# Patient Record
Sex: FEMALE | Race: WHITE
[De-identification: names, ages, dates, MRNs, and addresses within clinical notes are randomized per-mention and may not be internally consistent; named-entity substitution may affect disease eponyms.]

---

## 2017-02-06 ENCOUNTER — HOSPITAL ENCOUNTER (EMERGENCY)
Dept: HOSPITAL 53 - M ED | Age: 57
Discharge: HOME | End: 2017-02-06
Payer: COMMERCIAL

## 2017-02-06 DIAGNOSIS — Z79.899: ICD-10-CM

## 2017-02-06 DIAGNOSIS — G50.1: Primary | ICD-10-CM

## 2017-02-06 DIAGNOSIS — I10: ICD-10-CM

## 2017-02-06 NOTE — EDDOCDS
Physician Documentation                                                                           

[facility]                                                                         

Name: Madyson Snider                                                                                 

Age: 56 yrs                                                                                       

Sex: Female                                                                                       

: 1960                                                                                   

MRN: [id number]                                                                                     

Arrival Date: 2017                                                                          

Time: 11:14                                                                                       

Account#: [id number]                                                                              

Bed TR7                                                                                           

Private MD: MOJGAN TINAJERO                                                                           

Disposition:                                                                                      

17 12:21 Discharged to Home/Self Care. Impression: Atypical facial pain - prob dental       

abscess.                                                                                        

- Condition is Stable.                                                                            

- Discharge Instructions: Dental Abscess.                                                         

- Prescriptions for Clindamycin HCl 300 mg Oral Capsule - take 1 capsule by ORAL route            

every 6 hours; 40 capsule. Prednisone 20 mg Oral Tablet - take 1 tablet by ORAL route           

once daily for 5 days; 5 tablet. Hydrocodone- Acetaminophen 5-325 mg Oral Tablet -              

take 1 tablet by ORAL route every 6 hours As needed MDD: 4 tabs; 12 tablet.                     

- Medication Reconciliation, Local Pharmacy Hours form.                                           

- Follow up: MOJGAN TINAJERO; When: Call to arrange an appointment; Reason: Wound/Symptom             

Recheck, Recheck today's complaints, Worsening of conditions, Continuance of care.              

- Problem is an ongoing problem.                                                                  

- Symptoms are unchanged.                                                                         

- Notes: Call for follow up with the oral surgeon today. thanks.                                  

                                                                                                  

                                                                                                  

Historical:                                                                                       

- Allergies: no known allergies;                                                                  

- Home Meds:                                                                                      

1. atenolol 25 mg Oral tab 1 tab once daily                                                     

     (Last dose: 2017 07:00)                                                                

2. lisinopril 10 mg Oral tab 1 tab once daily                                                   

     (Last dose: 2017 07:00)                                                                

3. hydroxyzine HCl 50 mg Oral tab nightly                                                       

     (Last dose: 2017 20:00)                                                                

4. fluoxetine 10 mg Oral cap daily                                                              

     (Last dose: 2017 07:00)                                                                

5. trazodone 50 mg Oral tab daily                                                               

     (Last dose: 2017 20:00)                                                                

- PMHx: Hypertension;                                                                             

- PSHx: none;                                                                                     

- Social history: Smoking status: Patient states was never smoker of tobacco. No                  

barriers to communication noted, Speaks appropriately for age.                                  

- Family history: Not pertinent.                                                                  

- : The pt / caregiver states he / she is not on anticoagulants. Home medication list             

is obtained from the patient.                                                                   

- Exposure Risk Screening:: None identified.                                                      

                                                                                                  

                                                                                                  

OB/GYN:                                                                                           

                                                                                             

12:28 LMP 2017                                                                            [id number]

                                                                                                  

Vital Signs:                                                                                      

11:17  / 79; Pulse 93; Resp 18; Temp 97.5(O); Pulse Ox 97% on R/A; Weight 79.38 kg /    ct3 

      175 lbs (R); Height 5 ft. 0 in. (152.40 cm) (R); Pain 910;                                 

11:17 Body Mass Index 34.18 (79.38 kg, 152.40 cm)                                             ct3 

                                                                                                  

Signatures:                                                                                       

Giovanni Bonilla RN                       RN   ml6                                                  

Re Mckeon RN                    RN   js13                                                 

Olegario Wells, PA-C                    PA-C cc10                                                 

                                                                                                  

**************************************************************************************************

MTDD

## 2017-02-06 NOTE — EDDOCDS
Nurse's Notes                                                                                     

[facility]                                                                         

Name: Madyson Snider                                                                                 

Age: 56 yrs                                                                                       

Sex: Female                                                                                       

: 1960                                                                                   

MRN: [id number]                                                                                     

Arrival Date: 2017                                                                          

Time: 11:14                                                                                       

Account#: [id number]                                                                              

Bed TR7                                                                                           

Private MD: MOJGAN TINAJERO                                                                           

Diagnosis: Atypical facial pain-prob dental abscess                                               

                                                                                                  

Presentation:                                                                                     

                                                                                             

11:22 Presenting complaint: Patient states: states dental abscess, left upper jaw. Adult      ml6 

      Sepsis Screening: The patient does not have new or worsening altered mentation.             

      Patient's respiratory rate is less than 22. Systolic blood pressure is greater than         

      100. Patient has a qSOFA score of 0- Negative Sepsis Screen. Suicide/Homicide risk          

      assessment- the patient denies having any suicidal and/or homicidal ideations and does      

      not present with any other emotional, behavioral or mental health complaints.       

      Status: Patient is not a  or  dependent. Transition of care:          

      patient was not received from another setting of care.                                      

11:22 Acuity: PETER Level 4                                                                     ml6 

11:22 Method Of Arrival: Walkin/Carried/Asstd                                                 ml6 

                                                                                                  

Triage Assessment:                                                                                

11:26 General: Appears in no apparent distress, Behavior is appropriate for age, cooperative. ml6 

      Pain: Location: left cheek Pain currently is 6 out of 10 on a pain scale. HIV screening     

      NA for this visit Offered previously. Neurological: No deficits noted. Cardiovascular:      

      No deficits noted. Capillary refill < 3 seconds is brisk in bilateral fingers toes.         

      Respiratory: No deficits noted. GI: No deficits noted.                                      

                                                                                                  

OB/GYN:                                                                                           

12:28 LMP 2017                                                                            [id number]

                                                                                                  

Historical:                                                                                       

- Allergies: no known allergies;                                                                  

- Home Meds:                                                                                      

1. atenolol 25 mg Oral tab 1 tab once daily                                                     

     (Last dose: 2017 07:00)                                                                

2. lisinopril 10 mg Oral tab 1 tab once daily                                                   

     (Last dose: 2017 07:00)                                                                

3. hydroxyzine HCl 50 mg Oral tab nightly                                                       

     (Last dose: 2017 20:00)                                                                

4. fluoxetine 10 mg Oral cap daily                                                              

     (Last dose: 2017 07:00)                                                                

5. trazodone 50 mg Oral tab daily                                                               

     (Last dose: 2017 20:00)                                                                

- PMHx: Hypertension;                                                                             

- PSHx: none;                                                                                     

- Social history: Smoking status: Patient states was never smoker of tobacco. No                  

barriers to communication noted, Speaks appropriately for age.                                  

- Family history: Not pertinent.                                                                  

- : The pt / caregiver states he / she is not on anticoagulants. Home medication list             

is obtained from the patient.                                                                   

- Exposure Risk Screening:: None identified.                                                      

                                                                                                  

                                                                                                  

Screenin:27 Screening information is obtained from the patient. Fall risk: No risks identified.     js13

      Assistance ADL's: requires no assistance with activities of daily living. Abuse/DV          

      Screen: The patient / caregiver reports he/she is: not in a situation that causes fear,     

      pain or injury. Nutritional screening: No deficits noted. Advance Directives: There is      

      no active DNR order. home support is adequate.                                              

                                                                                                  

Assessment:                                                                                       

12:27 General: Appears in no apparent distress, Behavior is appropriate for age, cooperative. js13

      Pain: Pain currently is 5 out of 10 on a pain scale. Neurological: Level of                 

      Consciousness is awake, alert. Respiratory: Airway is patent Respiratory effort is          

      even, unlabored, Respiratory pattern is regular. Derm: Skin is pink, warm & dry.          

                                                                                                  

Vital Signs:                                                                                      

11:17  / 79; Pulse 93; Resp 18; Temp 97.5(O); Pulse Ox 97% on R/A; Weight 79.38 kg (R); ct3 

      Height 5 ft. 0 in. (152.40 cm) (R); Pain 9/10;                                              

11:17 Body Mass Index 34.18 (79.38 kg, 152.40 cm)                                             ct3 

                                                                                                  

Vitals:                                                                                           

11:17 Log In Time: 2017 at 11:15.                                                ct3 

                                                                                                  

ED Course:                                                                                        

11:15 Patient visited by Yu Eldridge PCA.                                              ct3 

11:15 Patient moved to Waiting                                                                ct3 

11:16 MOJGAN TINAJERO is Private Physician.                                                       ct3 

11:19 Patient moved to Pre RCE                                                                ct3 

11:23 Triage Initiated                                                                        ml6 

11:54 Patient moved to Triage 1                                                               ar3 

12:09 [facility]                                                           cc10

12:09 Lundborg-Gray, Maja, MD is Attending Physician.                                         cc10

12:09 Patient visited by Olegario Wells PA-C.                                                cc10

12:09 Patient visited by Olegario Wells PA-C.                                                cc10

12:20 MOJGAN TINAJERO is Referral Physician.                                                      cc10

12:27 The patient / caregiver is instructed regarding the plan of care and ED course.         js13

12:27 No IV's were initiated during this patient's visit. No procedures done that require     js13

      assistance.                                                                                 

12:31 Patient moved to TR7                                                                    ar3 

                                                                                                  

Order Results:                                                                                    

There are currently no results for this order.                                                    

Outcome:                                                                                          

12:21 Discharge ordered by Provider.                                                          cc10

12:27 Discharge Assessment: Patient awake, alert and oriented x 3. No cognitive and/or        js13

      functional deficits noted. Patient verbalized understanding of disposition                  

      instructions. patient administered narcotics - no. The following High Risk Discharge        

      criteria are identified: None. Discharged to home ambulatory. Condition: stable.            

      Discharge instructions given to patient, Instructed on discharge instructions, follow       

      up and referral plans. medication usage, Demonstrated understanding of instructions,        

      medications, Pt was receptive of discharge instructions/ teaching. Prescriptions given      

      X 3. No special radiology studies were completed. Property :Personal belongings             

      accompany Pt.                                                                               

12:31 Patient left the ED.                                                                    js13

                                                                                                  

Signatures:                                                                                       

Giovanni Bonilla, RN                       RN   ml6                                                  

Dimple Zamudio, PCA                      PCA  ar3                                                  

Yu Eldridge, PCA                  PCA  ct3                                                  

Re MkceonRN                    RN   js13                                                 

Olegario Wells, PA-C                    PA-C cc10                                                 

                                                                                                  

**************************************************************************************************

MTDD

## 2017-02-08 NOTE — EDDOCDS
Nurse's Notes                                                                                     

[facility]                                                                         

Name: Madyson Snider                                                                                 

Age: 56 yrs                                                                                       

Sex: Female                                                                                       

: 1960                                                                                   

MRN: [id number]                                                                                     

Arrival Date: 2017                                                                          

Time: 11:14                                                                                       

Account#: [id number]                                                                              

Bed TR7                                                                                           

Private MD: MOJGAN TINAJERO                                                                           

Diagnosis: Atypical facial pain-prob dental abscess                                               

                                                                                                  

Presentation:                                                                                     

                                                                                             

11:22 Presenting complaint: Patient states: states dental abscess, left upper jaw. Adult      ml6 

      Sepsis Screening: The patient does not have new or worsening altered mentation.             

      Patient's respiratory rate is less than 22. Systolic blood pressure is greater than         

      100. Patient has a qSOFA score of 0- Negative Sepsis Screen. Suicide/Homicide risk          

      assessment- the patient denies having any suicidal and/or homicidal ideations and does      

      not present with any other emotional, behavioral or mental health complaints.       

      Status: Patient is not a  or  dependent. Transition of care:          

      patient was not received from another setting of care.                                      

11:22 Acuity: PETER Level 4                                                                     ml6 

11:22 Method Of Arrival: Walkin/Carried/Asstd                                                 ml6 

                                                                                                  

Triage Assessment:                                                                                

11:26 General: Appears in no apparent distress, Behavior is appropriate for age, cooperative. ml6 

      Pain: Location: left cheek Pain currently is 6 out of 10 on a pain scale. HIV screening     

      NA for this visit Offered previously. Neurological: No deficits noted. Cardiovascular:      

      No deficits noted. Capillary refill < 3 seconds is brisk in bilateral fingers toes.         

      Respiratory: No deficits noted. GI: No deficits noted.                                      

                                                                                                  

OB/GYN:                                                                                           

12:28 LMP 2017                                                                            js13

                                                                                                  

Historical:                                                                                       

- Allergies: no known allergies;                                                                  

- Home Meds:                                                                                      

1. atenolol 25 mg Oral tab 1 tab once daily                                                     

     (Last dose: 2017 07:00)                                                                

2. lisinopril 10 mg Oral tab 1 tab once daily                                                   

     (Last dose: 2017 07:00)                                                                

3. hydroxyzine HCl 50 mg Oral tab nightly                                                       

     (Last dose: 2017 20:00)                                                                

4. fluoxetine 10 mg Oral cap daily                                                              

     (Last dose: 2017 07:00)                                                                

5. trazodone 50 mg Oral tab daily                                                               

     (Last dose: 2017 20:00)                                                                

- PMHx: Hypertension;                                                                             

- PSHx: none;                                                                                     

- Social history: Smoking status: Patient states was never smoker of tobacco. No                  

barriers to communication noted, Speaks appropriately for age.                                  

- Family history: Not pertinent.                                                                  

- : The pt / caregiver states he / she is not on anticoagulants. Home medication list             

is obtained from the patient.                                                                   

- Exposure Risk Screening:: None identified.                                                      

                                                                                                  

                                                                                                  

Screenin:27 Screening information is obtained from the patient. Fall risk: No risks identified.     js13

      Assistance ADL's: requires no assistance with activities of daily living. Abuse/DV          

      Screen: The patient / caregiver reports he/she is: not in a situation that causes fear,     

      pain or injury. Nutritional screening: No deficits noted. Advance Directives: There is      

      no active DNR order. home support is adequate.                                              

                                                                                                  

Assessment:                                                                                       

12:27 General: Appears in no apparent distress, Behavior is appropriate for age, cooperative. js13

      Pain: Pain currently is 5 out of 10 on a pain scale. Neurological: Level of                 

      Consciousness is awake, alert. Respiratory: Airway is patent Respiratory effort is          

      even, unlabored, Respiratory pattern is regular. Derm: Skin is pink, warm & dry.          

                                                                                                  

Vital Signs:                                                                                      

11:17  / 79; Pulse 93; Resp 18; Temp 97.5(O); Pulse Ox 97% on R/A; Weight 79.38 kg (R); ct3 

      Height 5 ft. 0 in. (152.40 cm) (R); Pain 9/10;                                              

11:17 Body Mass Index 34.18 (79.38 kg, 152.40 cm)                                             ct3 

                                                                                                  

Vitals:                                                                                           

11:17 Log In Time: 2017 at 11:15.                                                ct3 

                                                                                                  

ED Course:                                                                                        

11:15 Patient visited by Yu Eldridge PCA.                                              ct3 

11:15 Patient moved to Waiting                                                                ct3 

11:16 MOJGAN TINAJERO is Private Physician.                                                       ct3 

11:19 Patient moved to Pre RCE                                                                ct3 

11:23 Triage Initiated                                                                        ml6 

11:54 Patient moved to Triage 1                                                               ar3 

12:09 [facility]                                                           cc[facility]

12:09 Lundborg-Gray, Maja, MD is Attending Physician.                                         cc10

12:09 Patient visited by Olegario Wells PA-C.                                                cc10

12:09 Patient visited by Olegario Wells PA-C.                                                cc10

12:20 MOJGAN TINAJERO is Referral Physician.                                                      cc10

12:27 The patient / caregiver is instructed regarding the plan of care and ED course.         js13

12:27 No IV's were initiated during this patient's visit. No procedures done that require     js13

      assistance.                                                                                 

12:31 Patient moved to TR7                                                                    ar3 

12:36 [facility] Payment Agreement was scanned into [employer] and attached to record.               jp5 

15:19 Patient name changed from Madyson\S\M\S\Agatha\S\ to Madyson\S\Marchel\S\Agatha.                     
   EDMS

15:52 T-Sheet-- Draft Copy was scanned into [employer] and attached to record.                   klr 

                                                                                                  

Order Results:                                                                                    

There are currently no results for this order.                                                    

Outcome:                                                                                          

12:21 Discharge ordered by Provider.                                                          cc10

12:27 Discharge Assessment: Patient awake, alert and oriented x 3. No cognitive and/or        js13

      functional deficits noted. Patient verbalized understanding of disposition                  

      instructions. patient administered narcotics - no. The following High Risk Discharge        

      criteria are identified: None. Discharged to home ambulatory. Condition: stable.            

      Discharge instructions given to patient, Instructed on discharge instructions, follow       

      up and referral plans. medication usage, Demonstrated understanding of instructions,        

      medications, Pt was receptive of discharge instructions/ teaching. Prescriptions given      

      X 3. No special radiology studies were completed. Property :Personal belongings             

      accompany Pt.                                                                               

12:31 Patient left the ED.                                                                    js13

                                                                                                  

Signatures:                                                                                       

Dispatcher [facility]                                                 

Giovanni Bonilla, RN                       RN   ml6                                                  

Dimple Zamudio, PCA                      PCA  ar3                                                  

Yu Eldridge, PCA                  PCA  ct3                                                  

Re Mckeon RN                    RN   js13                                                 

Olegario Wells, PAERIC                    PA-C cc10                                                 

Daisha Donovan                              jp5                                                  

Naomi Carson                               klr                                                  

                                                                                                  

**************************************************************************************************



*** Chart Complete ***
MTDD

## 2017-02-08 NOTE — EDDOCDS
Physician Documentation                                                                           

[facility]                                                                         

Name: Madyson Snider                                                                                 

Age: 56 yrs                                                                                       

Sex: Female                                                                                       

: 1960                                                                                   

MRN: [id number]                                                                                     

Arrival Date: 2017                                                                          

Time: 11:14                                                                                       

Account#: [id number]                                                                              

Bed TR7                                                                                           

Private MD: MOJGAN TINAJERO                                                                           

Disposition:                                                                                      

17 12:21 Discharged to Home/Self Care. Impression: Atypical facial pain - prob dental       

abscess.                                                                                        

- Condition is Stable.                                                                            

- Discharge Instructions: Dental Abscess.                                                         

- Prescriptions for Clindamycin HCl 300 mg Oral Capsule - take 1 capsule by ORAL route            

every 6 hours; 40 capsule. Prednisone 20 mg Oral Tablet - take 1 tablet by ORAL route           

once daily for 5 days; 5 tablet. Hydrocodone- Acetaminophen 5-325 mg Oral Tablet -              

take 1 tablet by ORAL route every 6 hours As needed MDD: 4 tabs; 12 tablet.                     

- Medication Reconciliation, Local Pharmacy Hours form.                                           

- Follow up: MOJGAN TINAJERO; When: Call to arrange an appointment; Reason: Wound/Symptom             

Recheck, Recheck today's complaints, Worsening of conditions, Continuance of care.              

- Problem is an ongoing problem.                                                                  

- Symptoms are unchanged.                                                                         

- Notes: Call for follow up with the oral surgeon today. thanks.                                  

                                                                                                  

                                                                                                  

Historical:                                                                                       

- Allergies: no known allergies;                                                                  

- Home Meds:                                                                                      

1. atenolol 25 mg Oral tab 1 tab once daily                                                     

     (Last dose: 2017 07:00)                                                                

2. lisinopril 10 mg Oral tab 1 tab once daily                                                   

     (Last dose: 2017 07:00)                                                                

3. hydroxyzine HCl 50 mg Oral tab nightly                                                       

     (Last dose: 2017 20:00)                                                                

4. fluoxetine 10 mg Oral cap daily                                                              

     (Last dose: 2017 07:00)                                                                

5. trazodone 50 mg Oral tab daily                                                               

     (Last dose: 2017 20:00)                                                                

- PMHx: Hypertension;                                                                             

- PSHx: none;                                                                                     

- Social history: Smoking status: Patient states was never smoker of tobacco. No                  

barriers to communication noted, Speaks appropriately for age.                                  

- Family history: Not pertinent.                                                                  

- : The pt / caregiver states he / she is not on anticoagulants. Home medication list             

is obtained from the patient.                                                                   

- Exposure Risk Screening:: None identified.                                                      

                                                                                                  

                                                                                                  

OB/GYN:                                                                                           

                                                                                             

12:28 LMP 2017                                                                            js13

                                                                                                  

Vital Signs:                                                                                      

11:17  / 79; Pulse 93; Resp 18; Temp 97.5(O); Pulse Ox 97% on R/A; Weight 79.38 kg /    ct3 

      175 lbs (R); Height 5 ft. 0 in. (152.40 cm) (R); Pain 9/10;                                 

11:17 Body Mass Index 34.18 (79.38 kg, 152.40 cm)                                             ct3 

                                                                                                  

MDM:                                                                                              

12:36 [facility] Payment Agreement was scanned into [employer] and attached to record.               jp5 

12:36 Financial registration complete.                                                        jp5 

15:52 T-Sheet-- Draft Copy was scanned into [employer] and attached to record.                   klr 

                                                                                                  

Signatures:                                                                                       

Giovanni Bonilla RN                       RN   ml6                                                  

Re MckeonRN                    RN   js13                                                 

Olegario Wells PA-C                    PA-C cc10                                                 

Daisha Donovan                              jp5                                                  

Naomi Carson                               klr                                                  

                                                                                                  

The chart was reviewed and I authenticate all verbal orders and agree with the evaluation and 
treatment provided.Attachments:

12:36 [facility] Payment Agreement                                                                5 

15:52 T-Sheet-- Draft Copy                                                                    klr 

                                                                                                  

**************************************************************************************************



*** Chart Complete ***
MTDD

## 2017-02-08 NOTE — EDDOCDS
Physician Documentation                                                                           

[facility]                                                                         

Name: Madyson Snider                                                                                 

Age: 56 yrs                                                                                       

Sex: Female                                                                                       

: 1960                                                                                   

MRN: [id number]                                                                                     

Arrival Date: 2017                                                                          

Time: 11:14                                                                                       

Account#: [id number]                                                                              

Bed TR7                                                                                           

Private MD: MOJGAN TINAJERO                                                                           

Disposition:                                                                                      

17 12:21 Discharged to Home/Self Care. Impression: Atypical facial pain - prob dental       

abscess.                                                                                        

- Condition is Stable.                                                                            

- Discharge Instructions: Dental Abscess.                                                         

- Prescriptions for Clindamycin HCl 300 mg Oral Capsule - take 1 capsule by ORAL route            

every 6 hours; 40 capsule. Prednisone 20 mg Oral Tablet - take 1 tablet by ORAL route           

once daily for 5 days; 5 tablet. Hydrocodone- Acetaminophen 5-325 mg Oral Tablet -              

take 1 tablet by ORAL route every 6 hours As needed MDD: 4 tabs; 12 tablet.                     

- Medication Reconciliation, Local Pharmacy Hours form.                                           

- Follow up: MOJGAN TINAJERO; When: Call to arrange an appointment; Reason: Wound/Symptom             

Recheck, Recheck today's complaints, Worsening of conditions, Continuance of care.              

- Problem is an ongoing problem.                                                                  

- Symptoms are unchanged.                                                                         

- Notes: Call for follow up with the oral surgeon today. thanks.                                  

                                                                                                  

                                                                                                  

Historical:                                                                                       

- Allergies: no known allergies;                                                                  

- Home Meds:                                                                                      

1. atenolol 25 mg Oral tab 1 tab once daily                                                     

     (Last dose: 2017 07:00)                                                                

2. lisinopril 10 mg Oral tab 1 tab once daily                                                   

     (Last dose: 2017 07:00)                                                                

3. hydroxyzine HCl 50 mg Oral tab nightly                                                       

     (Last dose: 2017 20:00)                                                                

4. fluoxetine 10 mg Oral cap daily                                                              

     (Last dose: 2017 07:00)                                                                

5. trazodone 50 mg Oral tab daily                                                               

     (Last dose: 2017 20:00)                                                                

- PMHx: Hypertension;                                                                             

- PSHx: none;                                                                                     

- Social history: Smoking status: Patient states was never smoker of tobacco. No                  

barriers to communication noted, Speaks appropriately for age.                                  

- Family history: Not pertinent.                                                                  

- : The pt / caregiver states he / she is not on anticoagulants. Home medication list             

is obtained from the patient.                                                                   

- Exposure Risk Screening:: None identified.                                                      

                                                                                                  

                                                                                                  

OB/GYN:                                                                                           

                                                                                             

12:28 LMP 2017                                                                            [id number]

                                                                                                  

Vital Signs:                                                                                      

11:17  / 79; Pulse 93; Resp 18; Temp 97.5(O); Pulse Ox 97% on R/A; Weight 79.38 kg /    ct3 

      175 lbs (R); Height 5 ft. 0 in. (152.40 cm) (R); Pain 9/10;                                 

11:17 Body Mass Index 34.18 (79.38 kg, 152.40 cm)                                             ct3 

                                                                                                  

MDM:                                                                                              

12:36 [facility] Payment Agreement was scanned into [employer] and attached to record.               jp5 

12:36 Financial registration complete.                                                        jp5 

15:52 T-Sheet-- Draft Copy was scanned into [employer] and attached to record.                   klr 

                                                                                                  

Signatures:                                                                                       

Giovanni Bonilla RN                       RN   ml6                                                  

Re MckeonRN                    RN   js13                                                 

Olegario Wells PA-C                    PA-C cc10                                                 

Daisha Donovan                              jp5                                                  

Naomi Carson                               klr                                                  

                                                                                                  

The chart was reviewed and I authenticate all verbal orders and agree with the evaluation and 
treatment provided.Attachments:

12:36 [facility] Payment Agreement                                                                5 

15:52 T-Sheet-- Draft Copy                                                                    klr 

                                                                                                  

**************************************************************************************************



*** Chart Complete ***
MTDD

## 2017-03-03 ENCOUNTER — HOSPITAL ENCOUNTER (EMERGENCY)
Dept: HOSPITAL 53 - M ED | Age: 57
Discharge: HOME | End: 2017-03-03
Payer: COMMERCIAL

## 2017-03-03 VITALS — HEIGHT: 64 IN | WEIGHT: 175 LBS | BODY MASS INDEX: 29.88 KG/M2

## 2017-03-03 VITALS — DIASTOLIC BLOOD PRESSURE: 85 MMHG | SYSTOLIC BLOOD PRESSURE: 147 MMHG

## 2017-03-03 DIAGNOSIS — F41.9: ICD-10-CM

## 2017-03-03 DIAGNOSIS — I10: ICD-10-CM

## 2017-03-03 DIAGNOSIS — F32.9: ICD-10-CM

## 2017-03-03 DIAGNOSIS — G43.909: ICD-10-CM

## 2017-03-03 DIAGNOSIS — R53.1: Primary | ICD-10-CM

## 2017-03-03 DIAGNOSIS — Z79.899: ICD-10-CM

## 2017-03-03 DIAGNOSIS — G47.00: ICD-10-CM

## 2017-03-03 DIAGNOSIS — F10.10: ICD-10-CM

## 2017-03-03 LAB
ALBUMIN SERPL BCG-MCNC: 3.6 GM/DL (ref 3.2–5.2)
ALBUMIN/GLOB SERPL: 0.84 {RATIO} (ref 1–1.93)
ALP SERPL-CCNC: 159 U/L (ref 45–117)
ALT SERPL W P-5'-P-CCNC: 64 U/L (ref 12–78)
ANION GAP SERPL CALC-SCNC: 12 MEQ/L (ref 8–16)
AST SERPL-CCNC: 56 U/L (ref 15–37)
BASOPHILS # BLD AUTO: 0 K/MM3 (ref 0–0.2)
BASOPHILS NFR BLD AUTO: 0.3 % (ref 0–1)
BILIRUB CONJ SERPL-MCNC: < 0.1 MG/DL (ref 0–0.2)
BILIRUB SERPL-MCNC: 0.1 MG/DL (ref 0.2–1)
BUN SERPL-MCNC: 19 MG/DL (ref 7–18)
CALCIUM SERPL-MCNC: 8.7 MG/DL (ref 8.5–10.1)
CHLORIDE SERPL-SCNC: 108 MEQ/L (ref 98–107)
CO2 SERPL-SCNC: 27 MEQ/L (ref 21–32)
CONTROL LINE: (no result)
CREAT SERPL-MCNC: 1.07 MG/DL (ref 0.55–1.02)
EOSINOPHIL # BLD AUTO: 0.1 K/MM3 (ref 0–0.5)
EOSINOPHIL NFR BLD AUTO: 1.9 % (ref 0–3)
ERYTHROCYTE [DISTWIDTH] IN BLOOD BY AUTOMATED COUNT: 12.1 % (ref 11.5–14.5)
GFR SERPL CREATININE-BSD FRML MDRD: 56.9 ML/MIN/{1.73_M2} (ref 51–?)
GLUCOSE SERPL-MCNC: 98 MG/DL (ref 70–105)
INR PPP: 0.95
LARGE UNSTAINED CELL #: 0.2 K/MM3 (ref 0–0.4)
LARGE UNSTAINED CELL %: 3.3 % (ref 0–4)
LYMPHOCYTES # BLD AUTO: 2.2 K/MM3 (ref 1.5–4.5)
LYMPHOCYTES NFR BLD AUTO: 48.7 % (ref 24–44)
MCH RBC QN AUTO: 31.2 PG (ref 27–33)
MCHC RBC AUTO-ENTMCNC: 34.1 G/DL (ref 32–36.5)
MCV RBC AUTO: 91.7 FL (ref 80–96)
METHADONE UR QL SCN: NEGATIVE
MONOCYTES # BLD AUTO: 0.2 K/MM3 (ref 0–0.8)
MONOCYTES NFR BLD AUTO: 3.6 % (ref 0–5)
NEUTROPHILS # BLD AUTO: 1.9 K/MM3 (ref 1.8–7.7)
NEUTROPHILS NFR BLD AUTO: 42.2 % (ref 36–66)
PLATELET # BLD AUTO: 335 K/MM3 (ref 150–450)
POTASSIUM SERPL-SCNC: 3.8 MEQ/L (ref 3.5–5.1)
PROT SERPL-MCNC: 7.9 GM/DL (ref 6.4–8.2)
SODIUM SERPL-SCNC: 147 MEQ/L (ref 136–145)
TRICYCLIC ANTIDEPRESS URINE: NEGATIVE
WBC # BLD AUTO: 4.6 K/MM3 (ref 4–10)

## 2017-03-03 PROCEDURE — 80076 HEPATIC FUNCTION PANEL: CPT

## 2017-03-03 PROCEDURE — 85610 PROTHROMBIN TIME: CPT

## 2017-03-03 PROCEDURE — 93005 ELECTROCARDIOGRAM TRACING: CPT

## 2017-03-03 PROCEDURE — 85025 COMPLETE CBC W/AUTO DIFF WBC: CPT

## 2017-03-03 PROCEDURE — 82550 ASSAY OF CK (CPK): CPT

## 2017-03-03 PROCEDURE — 80048 BASIC METABOLIC PNL TOTAL CA: CPT

## 2017-03-03 PROCEDURE — 71010: CPT

## 2017-03-03 PROCEDURE — 70450 CT HEAD/BRAIN W/O DYE: CPT

## 2017-03-03 PROCEDURE — 82553 CREATINE MB FRACTION: CPT

## 2017-03-03 PROCEDURE — 80306 DRUG TEST PRSMV INSTRMNT: CPT

## 2017-03-03 PROCEDURE — 99285 EMERGENCY DEPT VISIT HI MDM: CPT

## 2017-03-03 PROCEDURE — 85730 THROMBOPLASTIN TIME PARTIAL: CPT

## 2017-03-03 PROCEDURE — 96360 HYDRATION IV INFUSION INIT: CPT

## 2017-03-03 PROCEDURE — 93041 RHYTHM ECG TRACING: CPT

## 2017-03-03 PROCEDURE — 96361 HYDRATE IV INFUSION ADD-ON: CPT

## 2017-03-03 PROCEDURE — 84443 ASSAY THYROID STIM HORMONE: CPT

## 2017-03-03 PROCEDURE — 94760 N-INVAS EAR/PLS OXIMETRY 1: CPT

## 2017-03-03 NOTE — REP
CT Head without contrast

 

HISTORY: Weakness

 

COMPARISON: 03/23/2015

 

There is no intraparenchymal hemorrhage, acute infarct,  mass or midline shift.

The ventricular system and cortical sulci are dilated consistent with minimal

volume loss. There is no extra cerebral collection.  There is no fracture.  The

visualized sinuses are clear.

 

IMPRESSION: Minimal volume loss.

 

 

 

 

Signed by

Emeka James MD 03/03/2017 03:58 P

## 2017-03-03 NOTE — REP
Chest x-ray:  Portable single-view AP chest:

 

History:  CVA.

 

Findings:  EKG monitoring electrodes overlie the chest.  Heart is not enlarged.

The aorta is calcific and tortuous.  Pulmonary vasculature is not increased.  No

significant bony abnormality is seen.

 

Impression:

 

No active disease.

 

 

Signed by

Jefry Thompson MD 03/03/2017 04:53 P

## 2017-03-04 NOTE — ECGEPIP
Stationary ECG Study

                           [facility] - ED

                                       

                                       Test Date:    2017

Pat Name:     ALLAN CALDWELL              Department:   

Patient ID:   [id number]                 Room:         -

Gender:       F                        Technician:   ct

:          1960               Requested By: Maja Lundborg-Gray 

Order Number: [id number]     Reading MD:   Katya Figueroa

                                 Measurements

Intervals                              Axis          

Rate:         98                       P:            51

TN:           142                      QRS:          31

QRSD:         84                       T:            43

QT:           324                                    

QTc:          414                                    

                           Interpretive Statements

SINUS RHYTHM WITH SINUS ARRHYTHMIA

POSSIBLE LEFT ATRIAL ENLARGEMENT

NONSPECIFIC T-WAVE ABNORMALITY

SIMILAR 3/23/15

Electronically Signed On 3-4-2017 12:26:05 EST by Katya Figueroa

## 2017-06-29 ENCOUNTER — HOSPITAL ENCOUNTER (OUTPATIENT)
Dept: HOSPITAL 53 - M LAB | Age: 57
End: 2017-06-29
Attending: NURSE PRACTITIONER
Payer: COMMERCIAL

## 2017-06-29 ENCOUNTER — HOSPITAL ENCOUNTER (OUTPATIENT)
Dept: HOSPITAL 53 - M LAB | Age: 57
End: 2017-06-29
Attending: ALLERGY & IMMUNOLOGY
Payer: COMMERCIAL

## 2017-06-29 DIAGNOSIS — L20.9: ICD-10-CM

## 2017-06-29 DIAGNOSIS — Z79.899: ICD-10-CM

## 2017-06-29 DIAGNOSIS — E78.00: ICD-10-CM

## 2017-06-29 DIAGNOSIS — J32.0: ICD-10-CM

## 2017-06-29 DIAGNOSIS — J30.2: Primary | ICD-10-CM

## 2017-06-29 DIAGNOSIS — E55.9: Primary | ICD-10-CM

## 2017-06-29 LAB
ALBUMIN SERPL BCG-MCNC: 3.6 GM/DL (ref 3.2–5.2)
ALBUMIN/GLOB SERPL: 0.92 {RATIO} (ref 1–1.93)
ALP SERPL-CCNC: 164 U/L (ref 45–117)
ALT SERPL W P-5'-P-CCNC: 107 U/L (ref 12–78)
ANION GAP SERPL CALC-SCNC: 11 MEQ/L (ref 8–16)
AST SERPL-CCNC: 88 U/L (ref 15–37)
BASOPHILS # BLD AUTO: 0 K/MM3 (ref 0–0.2)
BASOPHILS NFR BLD AUTO: 0.7 % (ref 0–1)
BILIRUB SERPL-MCNC: 0.2 MG/DL (ref 0.2–1)
BUN SERPL-MCNC: 11 MG/DL (ref 7–18)
C3 SERPL-MCNC: 182 MG/DL (ref 90–180)
C4 SERPL-MCNC: 28.6 MG/DL (ref 10–40)
CALCIUM SERPL-MCNC: 9.1 MG/DL (ref 8.5–10.1)
CHLORIDE SERPL-SCNC: 108 MEQ/L (ref 98–107)
CHOLEST SERPL-MCNC: 210 MG/DL (ref ?–200)
CO2 SERPL-SCNC: 23 MEQ/L (ref 21–32)
CREAT SERPL-MCNC: 0.75 MG/DL (ref 0.55–1.02)
EOSINOPHIL # BLD AUTO: 0.4 K/MM3 (ref 0–0.5)
EOSINOPHIL NFR BLD AUTO: 6.5 % (ref 0–3)
ERYTHROCYTE [DISTWIDTH] IN BLOOD BY AUTOMATED COUNT: 12.8 % (ref 11.5–14.5)
EST. AVERAGE GLUCOSE BLD GHB EST-MCNC: 126 MG/DL (ref 60–110)
GFR SERPL CREATININE-BSD FRML MDRD: > 60 ML/MIN/{1.73_M2} (ref 51–?)
GLUCOSE SERPL-MCNC: 117 MG/DL (ref 70–105)
IGA SERPL-MCNC: 293 MG/DL (ref 70–400)
IGE SERPL-ACNC: 501 IU/ML (ref ?–100)
IGG SERPL-MCNC: 960 MG/DL (ref 681–1648)
IGM SERPL-MCNC: 150 MG/DL (ref 40–230)
LYMPHOCYTES # BLD AUTO: 2.1 K/MM3 (ref 1.5–4.5)
LYMPHOCYTES NFR BLD AUTO: 33.6 % (ref 24–44)
MCH RBC QN AUTO: 33.7 PG (ref 27–33)
MCHC RBC AUTO-ENTMCNC: 34.3 G/DL (ref 32–36.5)
MCV RBC AUTO: 98.3 FL (ref 80–96)
MONOCYTES # BLD AUTO: 0.4 K/MM3 (ref 0–0.8)
MONOCYTES NFR BLD AUTO: 5.5 % (ref 0–5)
NEUTROPHILS # BLD AUTO: 3.2 K/MM3 (ref 1.8–7.7)
NEUTROPHILS NFR BLD AUTO: 50.5 % (ref 36–66)
PLATELET # BLD AUTO: 343 K/MM3 (ref 150–450)
POTASSIUM SERPL-SCNC: 4 MEQ/L (ref 3.5–5.1)
PROT SERPL-MCNC: 7.5 GM/DL (ref 6.4–8.2)
SODIUM SERPL-SCNC: 142 MEQ/L (ref 136–145)
T4 FREE SERPL-MCNC: 0.87 NG/DL (ref 0.76–1.46)
TRIGL SERPL-MCNC: 285 MG/DL (ref ?–150)
WBC # BLD AUTO: 6.3 K/MM3 (ref 4–10)

## 2017-07-02 LAB
A FUMIGATUS IGE QN: 1.83 KU/L
A1AT SERPL-MCNC: 139 MG/DL (ref 90–200)
ANNUAL BLUE GRASS IGE QN: 59 KU/L
C HERBARUM IGE QN: 0.85 KU/L
CAT DANDER IGE QN: 14.4 KU/L
COMMON RAGWEED IGE QN: 13.8 KU/L
ENGL PLANTAIN IGE QN: 34.8 KU/L
IGE SERPL-ACNC: 486 IU/ML (ref 0–100)
RED OAK IGE QN: 29.3 KU/L
WATER HEMP IGE QN: 52.4 KU/L
WHITE ASH IGE QN: 62.7 KU/L
WHITE ELM IGE QN: 3.41 KU/L
WHITE HICKORY IGE QN: 11.8 KU/L
WHOLE EGG IGE QN: < 0.1 KU/L

## 2017-09-05 ENCOUNTER — HOSPITAL ENCOUNTER (EMERGENCY)
Dept: HOSPITAL 53 - M ED | Age: 57
Discharge: HOME | End: 2017-09-05
Payer: COMMERCIAL

## 2017-09-05 VITALS
HEIGHT: 64 IN | DIASTOLIC BLOOD PRESSURE: 102 MMHG | WEIGHT: 164.02 LBS | BODY MASS INDEX: 28 KG/M2 | SYSTOLIC BLOOD PRESSURE: 170 MMHG

## 2017-09-05 DIAGNOSIS — R11.2: ICD-10-CM

## 2017-09-05 DIAGNOSIS — R19.7: ICD-10-CM

## 2017-09-05 DIAGNOSIS — E86.0: Primary | ICD-10-CM

## 2018-04-16 ENCOUNTER — HOSPITAL ENCOUNTER (OUTPATIENT)
Dept: HOSPITAL 53 - M RAD | Age: 58
End: 2018-04-16
Attending: NURSE PRACTITIONER
Payer: COMMERCIAL

## 2018-04-16 DIAGNOSIS — R93.8: Primary | ICD-10-CM

## 2018-04-16 PROCEDURE — 76604 US EXAM CHEST: CPT

## 2018-05-03 ENCOUNTER — HOSPITAL ENCOUNTER (EMERGENCY)
Dept: HOSPITAL 53 - M ED | Age: 58
Discharge: ANOTHER HOSPITAL | End: 2018-05-03
Payer: COMMERCIAL

## 2018-05-03 DIAGNOSIS — R56.9: ICD-10-CM

## 2018-05-03 DIAGNOSIS — I62.01: Primary | ICD-10-CM

## 2018-05-03 DIAGNOSIS — Z79.899: ICD-10-CM

## 2018-05-03 DIAGNOSIS — R94.31: ICD-10-CM

## 2018-05-03 LAB
ACETAMINOPHEN LEVEL: < 2 UG/ML (ref 10–30)
ALBUMIN/GLOBULIN RATIO: 0.9 (ref 1–1.93)
ALBUMIN: 4.3 GM/DL (ref 3.2–5.2)
ALKALINE PHOSPHATASE: 281 U/L (ref 45–117)
ALT SERPL W P-5'-P-CCNC: 149 U/L (ref 12–78)
AMMONIA PLAS-SCNC: 111 UMOL/L (ref ?–32)
AMORPHOUS SEDIMENT RFX: (no result)
AMPHETAMINES UR QL SCN: NEGATIVE
ANION GAP: 17 MEQ/L (ref 8–16)
AST SERPL-CCNC: 830 U/L (ref 7–37)
BARBITURATES UR QL SCN: NEGATIVE
BASO #: 0 10^3/UL (ref 0–0.2)
BASO %: 0.2 % (ref 0–1)
BENZODIAZ UR QL SCN: NEGATIVE
BILIRUB CONJ SERPL-MCNC: 1.3 MG/DL (ref 0–0.2)
BILIRUBIN,TOTAL: 2 MG/DL (ref 0.2–1)
BLOOD UREA NITROGEN: 23 MG/DL (ref 7–18)
BZE UR QL SCN: NEGATIVE
CALCIUM LEVEL: 9.2 MG/DL (ref 8.5–10.1)
CANNABINOIDS UR QL SCN: NEGATIVE
CARBON DIOXIDE LEVEL: 22 MEQ/L (ref 21–32)
CHLORIDE LEVEL: 100 MEQ/L (ref 98–107)
CK MB CFR.DF SERPL CALC: 0.04
CK SERPL-CCNC: (no result) U/L (ref 26–192)
CK-MB VALUE MASS: 12.2 NG/ML (ref ?–3.6)
CREATININE FOR GFR: 2.21 MG/DL (ref 0.55–1.3)
EOS #: 0 10^3/UL (ref 0–0.5)
EOSINOPHIL NFR BLD AUTO: 0 % (ref 0–3)
ETHYL ALCOHOL (ETHANOL): < 0.003 % (ref 0–0.01)
GFR SERPL CREATININE-BSD FRML MDRD: 29.7 ML/MIN/{1.73_M2} (ref 51–?)
GLUCOSE BLDC GLUCOMTR-MCNC: 189 MG/DL (ref 70–105)
GLUCOSE, FASTING: 179 MG/DL (ref 70–100)
HEMATOCRIT: 42.2 % (ref 36–47)
HEMOGLOBIN: 14.4 G/DL (ref 12–15.5)
IMMATURE GRANULOCYTE %: 0.4 % (ref 0–3)
LACTIC ACID SEPSIS PROTOCOL: 5.1 MMOL/L (ref 0.4–2)
LEUKOCYTE ESTERASE UR AUTO RFX: NEGATIVE
LYMPH #: 1.5 10^3/UL (ref 1.5–4.5)
LYMPH %: 12.1 % (ref 24–44)
MEAN CORPUSCULAR HEMOGLOBIN: 32.7 PG (ref 27–33)
MEAN CORPUSCULAR HGB CONC: 34.1 G/DL (ref 32–36.5)
MEAN CORPUSCULAR VOLUME: 95.7 FL (ref 80–96)
METHADONE URINE: NEGATIVE
MONO #: 0.7 10^3/UL (ref 0–0.8)
MONO %: 5.8 % (ref 0–5)
NEUTROPHILS #: 10.3 10^3/UL (ref 1.8–7.7)
NEUTROPHILS %: 81.5 % (ref 36–66)
NRBC BLD AUTO-RTO: 0.2 % (ref 0–0)
OPIATES UR QL SCN: NEGATIVE
PCP UR QL SCN: NEGATIVE
PLATELET COUNT, AUTOMATED: 274 10^3/UL (ref 150–450)
POTASSIUM SERUM: 3 MEQ/L (ref 3.5–5.1)
RED BLOOD COUNT: 4.41 10^6/UL (ref 4–5.4)
RED CELL DISTRIBUTION WIDTH: 13.4 % (ref 11.5–14.5)
SALICYLATE LEVEL: < 1.7 MG/DL (ref 5–30)
SODIUM LEVEL: 139 MEQ/L (ref 136–145)
SPECIFIC GRAVITY UR AUTO RFX: 1.02 (ref 1–1.03)
SQUAM EPITHELIAL CELL UR AURFX: 2 /HPF (ref 0–6)
THYROID STIMULATING HORMONE: 1.79 UIU/ML (ref 0.36–3.74)
TOTAL PROTEIN: 9.1 GM/DL (ref 6.4–8.2)
TROPONIN I: 0.43 NG/ML (ref ?–0.1)
WHITE BLOOD COUNT: 12.7 10^3/UL (ref 4–10)

## 2018-05-03 RX ADMIN — NALOXONE HYDROCHLORIDE 1 MG: 0.4 INJECTION, SOLUTION INTRAMUSCULAR; INTRAVENOUS; SUBCUTANEOUS at 11:18

## 2018-05-03 RX ADMIN — SODIUM CHLORIDE 1 MLS/HR: 9 INJECTION, SOLUTION INTRAVENOUS at 12:17

## 2018-05-03 RX ADMIN — ETOMIDATE 1 MG: 20 INJECTION, SOLUTION INTRAVENOUS at 12:05

## 2018-05-03 RX ADMIN — LEVETIRACETAM 1 MLS/HR: 500 INJECTION, SOLUTION, CONCENTRATE INTRAVENOUS at 12:24

## 2018-05-03 RX ADMIN — ADENOSINE 1 MG: 3 INJECTION, SOLUTION INTRAVENOUS at 11:28

## 2018-05-03 RX ADMIN — ROCURONIUM BROMIDE 1 MG: 10 INJECTION INTRAVENOUS at 12:05

## 2018-05-03 RX ADMIN — LABETALOL HYDROCHLORIDE 1 MLS/HR: 5 INJECTION INTRAVENOUS at 12:25

## 2018-05-03 RX ADMIN — SODIUM CHLORIDE 1 MLS/HR: 9 INJECTION, SOLUTION INTRAVENOUS at 11:22

## 2018-05-03 RX ADMIN — ADENOSINE 1 MG: 3 INJECTION, SOLUTION INTRAVENOUS at 11:25

## 2018-05-03 RX ADMIN — LORAZEPAM 1 MG: 2 INJECTION INTRAMUSCULAR; INTRAVENOUS at 11:23

## 2018-05-03 RX ADMIN — DEXTROSE MONOHYDRATE 1 MLS/HR: 50 INJECTION, SOLUTION INTRAVENOUS at 12:22

## 2018-05-10 ENCOUNTER — HOSPITAL ENCOUNTER (INPATIENT)
Dept: HOSPITAL 53 - M MS4PR | Age: 58
LOS: 11 days | Discharge: HOME | DRG: 58 | End: 2018-05-21
Attending: PHYSICAL MEDICINE & REHABILITATION | Admitting: PHYSICAL MEDICINE & REHABILITATION
Payer: COMMERCIAL

## 2018-05-10 DIAGNOSIS — I50.9: ICD-10-CM

## 2018-05-10 DIAGNOSIS — F41.9: ICD-10-CM

## 2018-05-10 DIAGNOSIS — R56.9: ICD-10-CM

## 2018-05-10 DIAGNOSIS — G47.00: ICD-10-CM

## 2018-05-10 DIAGNOSIS — Z79.01: ICD-10-CM

## 2018-05-10 DIAGNOSIS — M25.561: ICD-10-CM

## 2018-05-10 DIAGNOSIS — N32.89: ICD-10-CM

## 2018-05-10 DIAGNOSIS — Z79.899: ICD-10-CM

## 2018-05-10 DIAGNOSIS — R13.12: ICD-10-CM

## 2018-05-10 DIAGNOSIS — R32: ICD-10-CM

## 2018-05-10 DIAGNOSIS — R74.0: ICD-10-CM

## 2018-05-10 DIAGNOSIS — L40.50: ICD-10-CM

## 2018-05-10 DIAGNOSIS — I69.120: Primary | ICD-10-CM

## 2018-05-10 DIAGNOSIS — I47.1: ICD-10-CM

## 2018-05-10 DIAGNOSIS — F32.9: ICD-10-CM

## 2018-05-10 DIAGNOSIS — E83.42: ICD-10-CM

## 2018-05-10 DIAGNOSIS — Z91.040: ICD-10-CM

## 2018-05-10 DIAGNOSIS — M51.36: ICD-10-CM

## 2018-05-10 DIAGNOSIS — I11.0: ICD-10-CM

## 2018-05-10 DIAGNOSIS — J45.909: ICD-10-CM

## 2018-05-10 LAB — MAGNESIUM LEVEL: 1.9 MG/DL (ref 1.8–2.4)

## 2018-05-10 RX ADMIN — Medication 1 MG: at 21:01

## 2018-05-10 RX ADMIN — LACOSAMIDE 1 MG: 50 TABLET, FILM COATED ORAL at 21:00

## 2018-05-10 RX ADMIN — DOCUSATE SODIUM,SENNOSIDES 1 TAB: 50; 8.6 TABLET, FILM COATED ORAL at 21:00

## 2018-05-10 RX ADMIN — LEVETIRACETAM 1 MG: 250 TABLET, FILM COATED ORAL at 21:01

## 2018-05-10 RX ADMIN — METOPROLOL TARTRATE 1 MG: 50 TABLET, FILM COATED ORAL at 21:00

## 2018-05-11 LAB
ALBUMIN/GLOBULIN RATIO: 0.71 (ref 1–1.93)
ALBUMIN: 3 GM/DL (ref 3.2–5.2)
ALKALINE PHOSPHATASE: 173 U/L (ref 45–117)
ALT SERPL W P-5'-P-CCNC: 124 U/L (ref 12–78)
ANION GAP: 6 MEQ/L (ref 8–16)
AST SERPL-CCNC: 128 U/L (ref 7–37)
BASO #: 0 10^3/UL (ref 0–0.2)
BASO %: 0.4 % (ref 0–1)
BILIRUBIN,TOTAL: 0.6 MG/DL (ref 0.2–1)
BLOOD UREA NITROGEN: 8 MG/DL (ref 7–18)
CALCIUM LEVEL: 9.8 MG/DL (ref 8.5–10.1)
CARBON DIOXIDE LEVEL: 30 MEQ/L (ref 21–32)
CHLORIDE LEVEL: 105 MEQ/L (ref 98–107)
CREATININE FOR GFR: 0.69 MG/DL (ref 0.55–1.3)
EOS #: 0.1 10^3/UL (ref 0–0.5)
EOSINOPHIL NFR BLD AUTO: 1.5 % (ref 0–3)
GFR SERPL CREATININE-BSD FRML MDRD: > 60 ML/MIN/{1.73_M2} (ref 51–?)
GLUCOSE, FASTING: 111 MG/DL (ref 70–100)
HEMATOCRIT: 32.5 % (ref 36–47)
HEMOGLOBIN: 10.7 G/DL (ref 12–15.5)
IMMATURE GRANULOCYTE %: 1.7 % (ref 0–3)
LYMPH #: 2 10^3/UL (ref 1.5–4.5)
LYMPH %: 21.4 % (ref 24–44)
MEAN CORPUSCULAR HEMOGLOBIN: 32.4 PG (ref 27–33)
MEAN CORPUSCULAR HGB CONC: 32.9 G/DL (ref 32–36.5)
MEAN CORPUSCULAR VOLUME: 98.5 FL (ref 80–96)
MONO #: 1.3 10^3/UL (ref 0–0.8)
MONO %: 14.2 % (ref 0–5)
NEUTROPHILS #: 5.6 10^3/UL (ref 1.8–7.7)
NEUTROPHILS %: 60.8 % (ref 36–66)
NRBC BLD AUTO-RTO: 0 % (ref 0–0)
PLATELET COUNT, AUTOMATED: 482 10^3/UL (ref 150–450)
POTASSIUM SERUM: 4.5 MEQ/L (ref 3.5–5.1)
RED BLOOD COUNT: 3.3 10^6/UL (ref 4–5.4)
RED CELL DISTRIBUTION WIDTH: 13.9 % (ref 11.5–14.5)
SODIUM LEVEL: 141 MEQ/L (ref 136–145)
TOTAL PROTEIN: 7.2 GM/DL (ref 6.4–8.2)
WHITE BLOOD COUNT: 9.2 10^3/UL (ref 4–10)

## 2018-05-11 RX ADMIN — Medication 1 MG: at 20:43

## 2018-05-11 RX ADMIN — METOPROLOL TARTRATE 1 MG: 50 TABLET, FILM COATED ORAL at 20:45

## 2018-05-11 RX ADMIN — LACOSAMIDE 1 MG: 50 TABLET, FILM COATED ORAL at 09:34

## 2018-05-11 RX ADMIN — MULTIPLE VITAMINS W/ MINERALS TAB 1 TAB: TAB at 09:33

## 2018-05-11 RX ADMIN — Medication 1 UNITS: at 09:34

## 2018-05-11 RX ADMIN — DOCUSATE SODIUM,SENNOSIDES 1 TAB: 50; 8.6 TABLET, FILM COATED ORAL at 20:43

## 2018-05-11 RX ADMIN — LEVETIRACETAM 1 MG: 250 TABLET, FILM COATED ORAL at 09:33

## 2018-05-11 RX ADMIN — DOCUSATE SODIUM,SENNOSIDES 1 TAB: 50; 8.6 TABLET, FILM COATED ORAL at 09:33

## 2018-05-11 RX ADMIN — DICLOFENAC EPOLAMINE 1 PATCH: 0.01 SYSTEM TOPICAL at 20:45

## 2018-05-11 RX ADMIN — ACETAMINOPHEN 1 MG: 325 TABLET ORAL at 17:44

## 2018-05-11 RX ADMIN — Medication 1 MG: at 09:35

## 2018-05-11 RX ADMIN — LEVETIRACETAM 1 MG: 250 TABLET, FILM COATED ORAL at 20:43

## 2018-05-11 RX ADMIN — ENOXAPARIN SODIUM 1 MG: 40 INJECTION SUBCUTANEOUS at 09:34

## 2018-05-11 RX ADMIN — MONTELUKAST SODIUM 1 MG: 10 TABLET, FILM COATED ORAL at 09:33

## 2018-05-11 RX ADMIN — DICLOFENAC EPOLAMINE 1 PATCH: 0.01 SYSTEM TOPICAL at 12:39

## 2018-05-11 RX ADMIN — LACOSAMIDE 1 MG: 50 TABLET, FILM COATED ORAL at 20:43

## 2018-05-11 RX ADMIN — METOPROLOL TARTRATE 1 MG: 50 TABLET, FILM COATED ORAL at 09:34

## 2018-05-11 RX ADMIN — TOLTERODINE 1 MG: 2 CAPSULE, EXTENDED RELEASE ORAL at 09:33

## 2018-05-12 RX ADMIN — LACOSAMIDE 1 MG: 50 TABLET, FILM COATED ORAL at 20:47

## 2018-05-12 RX ADMIN — ENOXAPARIN SODIUM 1 MG: 40 INJECTION SUBCUTANEOUS at 09:34

## 2018-05-12 RX ADMIN — MULTIPLE VITAMINS W/ MINERALS TAB 1 TAB: TAB at 09:33

## 2018-05-12 RX ADMIN — DICLOFENAC EPOLAMINE 1 PATCH: 0.01 SYSTEM TOPICAL at 09:34

## 2018-05-12 RX ADMIN — ACETAMINOPHEN 1 MG: 325 TABLET ORAL at 11:51

## 2018-05-12 RX ADMIN — MONTELUKAST SODIUM 1 MG: 10 TABLET, FILM COATED ORAL at 09:33

## 2018-05-12 RX ADMIN — DOCUSATE SODIUM,SENNOSIDES 1 TAB: 50; 8.6 TABLET, FILM COATED ORAL at 20:47

## 2018-05-12 RX ADMIN — METOPROLOL TARTRATE 1 MG: 50 TABLET, FILM COATED ORAL at 20:47

## 2018-05-12 RX ADMIN — LACOSAMIDE 1 MG: 50 TABLET, FILM COATED ORAL at 09:33

## 2018-05-12 RX ADMIN — LEVETIRACETAM 1 MG: 250 TABLET, FILM COATED ORAL at 20:46

## 2018-05-12 RX ADMIN — METOPROLOL TARTRATE 1 MG: 50 TABLET, FILM COATED ORAL at 09:33

## 2018-05-12 RX ADMIN — ACETAMINOPHEN 1 MG: 325 TABLET ORAL at 19:48

## 2018-05-12 RX ADMIN — Medication 1 UNITS: at 09:33

## 2018-05-12 RX ADMIN — Medication 1 MG: at 09:32

## 2018-05-12 RX ADMIN — Medication 1 MG: at 20:56

## 2018-05-12 RX ADMIN — TOLTERODINE 1 MG: 2 CAPSULE, EXTENDED RELEASE ORAL at 09:32

## 2018-05-12 RX ADMIN — DICLOFENAC EPOLAMINE 1 PATCH: 0.01 SYSTEM TOPICAL at 20:56

## 2018-05-12 RX ADMIN — LEVETIRACETAM 1 MG: 250 TABLET, FILM COATED ORAL at 09:32

## 2018-05-12 RX ADMIN — DOCUSATE SODIUM,SENNOSIDES 1 TAB: 50; 8.6 TABLET, FILM COATED ORAL at 09:33

## 2018-05-13 LAB
ALBUMIN/GLOBULIN RATIO: 0.68 (ref 1–1.93)
ALBUMIN: 3.2 GM/DL (ref 3.2–5.2)
ALKALINE PHOSPHATASE: 229 U/L (ref 45–117)
ALT SERPL W P-5'-P-CCNC: 133 U/L (ref 12–78)
ANION GAP: 9 MEQ/L (ref 8–16)
AST SERPL-CCNC: 113 U/L (ref 7–37)
BILIRUBIN,TOTAL: 0.5 MG/DL (ref 0.2–1)
BLOOD UREA NITROGEN: 10 MG/DL (ref 7–18)
CALCIUM LEVEL: 9.8 MG/DL (ref 8.5–10.1)
CARBON DIOXIDE LEVEL: 26 MEQ/L (ref 21–32)
CHLORIDE LEVEL: 106 MEQ/L (ref 98–107)
CREATININE FOR GFR: 0.74 MG/DL (ref 0.55–1.3)
GFR SERPL CREATININE-BSD FRML MDRD: > 60 ML/MIN/{1.73_M2} (ref 51–?)
GLUCOSE, FASTING: 102 MG/DL (ref 70–100)
HEMATOCRIT: 32.9 % (ref 36–47)
HEMOGLOBIN: 11 G/DL (ref 12–15.5)
MEAN CORPUSCULAR HEMOGLOBIN: 32.9 PG (ref 27–33)
MEAN CORPUSCULAR HGB CONC: 33.4 G/DL (ref 32–36.5)
MEAN CORPUSCULAR VOLUME: 98.5 FL (ref 80–96)
NRBC BLD AUTO-RTO: 0 % (ref 0–0)
PLATELET COUNT, AUTOMATED: 542 10^3/UL (ref 150–450)
POTASSIUM SERUM: 4.1 MEQ/L (ref 3.5–5.1)
RED BLOOD COUNT: 3.34 10^6/UL (ref 4–5.4)
RED CELL DISTRIBUTION WIDTH: 13.6 % (ref 11.5–14.5)
SODIUM LEVEL: 141 MEQ/L (ref 136–145)
TOTAL PROTEIN: 7.9 GM/DL (ref 6.4–8.2)
WHITE BLOOD COUNT: 10.4 10^3/UL (ref 4–10)

## 2018-05-13 RX ADMIN — Medication 1 UNITS: at 08:53

## 2018-05-13 RX ADMIN — METOPROLOL TARTRATE 1 MG: 50 TABLET, FILM COATED ORAL at 20:31

## 2018-05-13 RX ADMIN — MULTIPLE VITAMINS W/ MINERALS TAB 1 TAB: TAB at 08:53

## 2018-05-13 RX ADMIN — LACOSAMIDE 1 MG: 50 TABLET, FILM COATED ORAL at 08:53

## 2018-05-13 RX ADMIN — LEVETIRACETAM 1 MG: 250 TABLET, FILM COATED ORAL at 20:32

## 2018-05-13 RX ADMIN — DOCUSATE SODIUM,SENNOSIDES 1 TAB: 50; 8.6 TABLET, FILM COATED ORAL at 08:53

## 2018-05-13 RX ADMIN — MONTELUKAST SODIUM 1 MG: 10 TABLET, FILM COATED ORAL at 08:53

## 2018-05-13 RX ADMIN — ACETAMINOPHEN 1 MG: 325 TABLET ORAL at 08:54

## 2018-05-13 RX ADMIN — ENOXAPARIN SODIUM 1 MG: 40 INJECTION SUBCUTANEOUS at 08:52

## 2018-05-13 RX ADMIN — Medication 1 MG: at 20:31

## 2018-05-13 RX ADMIN — ACETAMINOPHEN 1 MG: 325 TABLET ORAL at 16:11

## 2018-05-13 RX ADMIN — METOPROLOL TARTRATE 1 MG: 50 TABLET, FILM COATED ORAL at 08:54

## 2018-05-13 RX ADMIN — DOCUSATE SODIUM,SENNOSIDES 1 TAB: 50; 8.6 TABLET, FILM COATED ORAL at 20:32

## 2018-05-13 RX ADMIN — BISACODYL 1 MG: 5 TABLET, COATED ORAL at 20:31

## 2018-05-13 RX ADMIN — Medication 1 MG: at 08:52

## 2018-05-13 RX ADMIN — TOLTERODINE 1 MG: 2 CAPSULE, EXTENDED RELEASE ORAL at 08:54

## 2018-05-13 RX ADMIN — DICLOFENAC EPOLAMINE 1 PATCH: 0.01 SYSTEM TOPICAL at 20:32

## 2018-05-13 RX ADMIN — LEVETIRACETAM 1 MG: 250 TABLET, FILM COATED ORAL at 08:52

## 2018-05-13 RX ADMIN — DICLOFENAC EPOLAMINE 1 PATCH: 0.01 SYSTEM TOPICAL at 08:55

## 2018-05-13 RX ADMIN — LACOSAMIDE 1 MG: 50 TABLET, FILM COATED ORAL at 20:31

## 2018-05-14 RX ADMIN — METOPROLOL TARTRATE 1 MG: 50 TABLET, FILM COATED ORAL at 10:12

## 2018-05-14 RX ADMIN — DICLOFENAC EPOLAMINE 1 PATCH: 0.01 SYSTEM TOPICAL at 10:08

## 2018-05-14 RX ADMIN — DOCUSATE SODIUM,SENNOSIDES 1 TAB: 50; 8.6 TABLET, FILM COATED ORAL at 10:09

## 2018-05-14 RX ADMIN — DOCUSATE SODIUM,SENNOSIDES 1 TAB: 50; 8.6 TABLET, FILM COATED ORAL at 20:20

## 2018-05-14 RX ADMIN — ENOXAPARIN SODIUM 1 MG: 40 INJECTION SUBCUTANEOUS at 10:08

## 2018-05-14 RX ADMIN — Medication 1 MG: at 20:17

## 2018-05-14 RX ADMIN — ACETAMINOPHEN 1 MG: 325 TABLET ORAL at 18:44

## 2018-05-14 RX ADMIN — MONTELUKAST SODIUM 1 MG: 10 TABLET, FILM COATED ORAL at 10:10

## 2018-05-14 RX ADMIN — ACETAMINOPHEN 1 MG: 325 TABLET ORAL at 04:21

## 2018-05-14 RX ADMIN — TOLTERODINE 1 MG: 2 CAPSULE, EXTENDED RELEASE ORAL at 10:09

## 2018-05-14 RX ADMIN — MULTIPLE VITAMINS W/ MINERALS TAB 1 TAB: TAB at 10:09

## 2018-05-14 RX ADMIN — LEVETIRACETAM 1 MG: 250 TABLET, FILM COATED ORAL at 20:18

## 2018-05-14 RX ADMIN — LEVETIRACETAM 1 MG: 250 TABLET, FILM COATED ORAL at 10:08

## 2018-05-14 RX ADMIN — LACOSAMIDE 1 MG: 50 TABLET, FILM COATED ORAL at 20:18

## 2018-05-14 RX ADMIN — Medication 1 MG: at 10:08

## 2018-05-14 RX ADMIN — AMANTADINE HYDROCHLORIDE 1 MG: 100 CAPSULE ORAL at 17:53

## 2018-05-14 RX ADMIN — Medication 1 UNITS: at 10:09

## 2018-05-14 RX ADMIN — DICLOFENAC EPOLAMINE 1 PATCH: 0.01 SYSTEM TOPICAL at 20:17

## 2018-05-14 RX ADMIN — LACOSAMIDE 1 MG: 50 TABLET, FILM COATED ORAL at 10:10

## 2018-05-14 RX ADMIN — METOPROLOL TARTRATE 1 MG: 50 TABLET, FILM COATED ORAL at 20:20

## 2018-05-15 RX ADMIN — ACETAMINOPHEN 1 MG: 325 TABLET ORAL at 04:45

## 2018-05-15 RX ADMIN — LEVETIRACETAM 1 MG: 250 TABLET, FILM COATED ORAL at 20:44

## 2018-05-15 RX ADMIN — LEVETIRACETAM 1 MG: 250 TABLET, FILM COATED ORAL at 10:07

## 2018-05-15 RX ADMIN — DICLOFENAC EPOLAMINE 1 PATCH: 0.01 SYSTEM TOPICAL at 20:43

## 2018-05-15 RX ADMIN — MULTIPLE VITAMINS W/ MINERALS TAB 1 TAB: TAB at 10:03

## 2018-05-15 RX ADMIN — METOPROLOL TARTRATE 1 MG: 50 TABLET, FILM COATED ORAL at 20:46

## 2018-05-15 RX ADMIN — Medication 1 UNITS: at 10:04

## 2018-05-15 RX ADMIN — Medication 1 MG: at 20:44

## 2018-05-15 RX ADMIN — DOCUSATE SODIUM,SENNOSIDES 1 TAB: 50; 8.6 TABLET, FILM COATED ORAL at 20:45

## 2018-05-15 RX ADMIN — DOCUSATE SODIUM,SENNOSIDES 1 TAB: 50; 8.6 TABLET, FILM COATED ORAL at 10:04

## 2018-05-15 RX ADMIN — ACETAMINOPHEN 1 MG: 325 TABLET ORAL at 14:30

## 2018-05-15 RX ADMIN — AMANTADINE HYDROCHLORIDE 1 MG: 100 CAPSULE ORAL at 10:05

## 2018-05-15 RX ADMIN — LACOSAMIDE 1 MG: 50 TABLET, FILM COATED ORAL at 20:45

## 2018-05-15 RX ADMIN — LACOSAMIDE 1 MG: 50 TABLET, FILM COATED ORAL at 10:05

## 2018-05-15 RX ADMIN — Medication 1 MG: at 10:02

## 2018-05-15 RX ADMIN — DICLOFENAC EPOLAMINE 1 PATCH: 0.01 SYSTEM TOPICAL at 10:06

## 2018-05-15 RX ADMIN — AMITRIPTYLINE HYDROCHLORIDE 1 MG: 25 TABLET, FILM COATED ORAL at 20:44

## 2018-05-15 RX ADMIN — METOPROLOL TARTRATE 1 MG: 50 TABLET, FILM COATED ORAL at 10:03

## 2018-05-15 RX ADMIN — ENOXAPARIN SODIUM 1 MG: 40 INJECTION SUBCUTANEOUS at 10:02

## 2018-05-15 RX ADMIN — TOLTERODINE 1 MG: 2 CAPSULE, EXTENDED RELEASE ORAL at 10:10

## 2018-05-15 RX ADMIN — MONTELUKAST SODIUM 1 MG: 10 TABLET, FILM COATED ORAL at 10:06

## 2018-05-16 LAB
ALBUMIN/GLOBULIN RATIO: 0.72 (ref 1–1.93)
ALBUMIN: 3.6 GM/DL (ref 3.2–5.2)
ALKALINE PHOSPHATASE: 254 U/L (ref 45–117)
ALT SERPL W P-5'-P-CCNC: 138 U/L (ref 12–78)
ANION GAP: 7 MEQ/L (ref 8–16)
AST SERPL-CCNC: 96 U/L (ref 7–37)
BILIRUBIN,TOTAL: 0.5 MG/DL (ref 0.2–1)
BLOOD UREA NITROGEN: 13 MG/DL (ref 7–18)
CALCIUM LEVEL: 10.1 MG/DL (ref 8.5–10.1)
CARBON DIOXIDE LEVEL: 25 MEQ/L (ref 21–32)
CHLORIDE LEVEL: 105 MEQ/L (ref 98–107)
CREATININE FOR GFR: 0.74 MG/DL (ref 0.55–1.3)
GFR SERPL CREATININE-BSD FRML MDRD: > 60 ML/MIN/{1.73_M2} (ref 51–?)
GLUCOSE, FASTING: 99 MG/DL (ref 70–100)
HEMATOCRIT: 35.3 % (ref 36–47)
HEMOGLOBIN: 12 G/DL (ref 12–15.5)
MEAN CORPUSCULAR HEMOGLOBIN: 33 PG (ref 27–33)
MEAN CORPUSCULAR HGB CONC: 34 G/DL (ref 32–36.5)
MEAN CORPUSCULAR VOLUME: 97 FL (ref 80–96)
NRBC BLD AUTO-RTO: 0 % (ref 0–0)
PLATELET COUNT, AUTOMATED: 623 10^3/UL (ref 150–450)
POTASSIUM SERUM: 4.3 MEQ/L (ref 3.5–5.1)
RED BLOOD COUNT: 3.64 10^6/UL (ref 4–5.4)
RED CELL DISTRIBUTION WIDTH: 13 % (ref 11.5–14.5)
SODIUM LEVEL: 137 MEQ/L (ref 136–145)
TOTAL PROTEIN: 8.6 GM/DL (ref 6.4–8.2)
WHITE BLOOD COUNT: 12.1 10^3/UL (ref 4–10)

## 2018-05-16 RX ADMIN — Medication 1 MG: at 08:17

## 2018-05-16 RX ADMIN — LACOSAMIDE 1 MG: 50 TABLET, FILM COATED ORAL at 08:16

## 2018-05-16 RX ADMIN — DOCUSATE SODIUM,SENNOSIDES 1 TAB: 50; 8.6 TABLET, FILM COATED ORAL at 08:17

## 2018-05-16 RX ADMIN — LEVETIRACETAM 1 MG: 250 TABLET, FILM COATED ORAL at 20:27

## 2018-05-16 RX ADMIN — Medication 1 UNITS: at 08:15

## 2018-05-16 RX ADMIN — LEVETIRACETAM 1 MG: 250 TABLET, FILM COATED ORAL at 08:15

## 2018-05-16 RX ADMIN — METOPROLOL TARTRATE 1 MG: 50 TABLET, FILM COATED ORAL at 08:15

## 2018-05-16 RX ADMIN — METOPROLOL TARTRATE 1 MG: 50 TABLET, FILM COATED ORAL at 20:32

## 2018-05-16 RX ADMIN — Medication 1 MG: at 20:26

## 2018-05-16 RX ADMIN — AMANTADINE HYDROCHLORIDE 1 MG: 100 CAPSULE ORAL at 08:14

## 2018-05-16 RX ADMIN — AMITRIPTYLINE HYDROCHLORIDE 1 MG: 50 TABLET, FILM COATED ORAL at 20:26

## 2018-05-16 RX ADMIN — ACETAMINOPHEN 1 MG: 325 TABLET ORAL at 08:20

## 2018-05-16 RX ADMIN — DICLOFENAC EPOLAMINE 1 PATCH: 0.01 SYSTEM TOPICAL at 20:26

## 2018-05-16 RX ADMIN — TOLTERODINE 1 MG: 2 CAPSULE, EXTENDED RELEASE ORAL at 08:16

## 2018-05-16 RX ADMIN — DOCUSATE SODIUM,SENNOSIDES 1 TAB: 50; 8.6 TABLET, FILM COATED ORAL at 20:26

## 2018-05-16 RX ADMIN — MONTELUKAST SODIUM 1 MG: 10 TABLET, FILM COATED ORAL at 08:15

## 2018-05-16 RX ADMIN — MULTIPLE VITAMINS W/ MINERALS TAB 1 TAB: TAB at 08:16

## 2018-05-16 RX ADMIN — LACOSAMIDE 1 MG: 50 TABLET, FILM COATED ORAL at 20:26

## 2018-05-16 RX ADMIN — ENOXAPARIN SODIUM 1 MG: 40 INJECTION SUBCUTANEOUS at 08:14

## 2018-05-16 RX ADMIN — ACETAMINOPHEN 1 MG: 325 TABLET ORAL at 19:17

## 2018-05-16 RX ADMIN — DICLOFENAC EPOLAMINE 1 PATCH: 0.01 SYSTEM TOPICAL at 08:13

## 2018-05-17 RX ADMIN — LEVETIRACETAM 1 MG: 250 TABLET, FILM COATED ORAL at 20:19

## 2018-05-17 RX ADMIN — DICLOFENAC EPOLAMINE 1 PATCH: 0.01 SYSTEM TOPICAL at 09:01

## 2018-05-17 RX ADMIN — AMITRIPTYLINE HYDROCHLORIDE 1 MG: 50 TABLET, FILM COATED ORAL at 20:19

## 2018-05-17 RX ADMIN — METOPROLOL TARTRATE 1 MG: 50 TABLET, FILM COATED ORAL at 08:59

## 2018-05-17 RX ADMIN — ENOXAPARIN SODIUM 1 MG: 40 INJECTION SUBCUTANEOUS at 09:00

## 2018-05-17 RX ADMIN — DOCUSATE SODIUM,SENNOSIDES 1 TAB: 50; 8.6 TABLET, FILM COATED ORAL at 20:19

## 2018-05-17 RX ADMIN — TOLTERODINE 1 MG: 2 CAPSULE, EXTENDED RELEASE ORAL at 08:58

## 2018-05-17 RX ADMIN — DICLOFENAC EPOLAMINE 1 PATCH: 0.01 SYSTEM TOPICAL at 20:20

## 2018-05-17 RX ADMIN — METOPROLOL TARTRATE 1 MG: 50 TABLET, FILM COATED ORAL at 20:20

## 2018-05-17 RX ADMIN — Medication 1 MG: at 09:01

## 2018-05-17 RX ADMIN — Medication 1 UNITS: at 10:37

## 2018-05-17 RX ADMIN — LACOSAMIDE 1 MG: 50 TABLET, FILM COATED ORAL at 20:19

## 2018-05-17 RX ADMIN — DOCUSATE SODIUM,SENNOSIDES 1 TAB: 50; 8.6 TABLET, FILM COATED ORAL at 08:59

## 2018-05-17 RX ADMIN — Medication 1 MG: at 20:19

## 2018-05-17 RX ADMIN — ACETAMINOPHEN 1 MG: 325 TABLET ORAL at 08:58

## 2018-05-17 RX ADMIN — AMANTADINE HYDROCHLORIDE 1 MG: 100 CAPSULE ORAL at 08:59

## 2018-05-17 RX ADMIN — MONTELUKAST SODIUM 1 MG: 10 TABLET, FILM COATED ORAL at 08:59

## 2018-05-17 RX ADMIN — LACOSAMIDE 1 MG: 50 TABLET, FILM COATED ORAL at 09:00

## 2018-05-17 RX ADMIN — MULTIPLE VITAMINS W/ MINERALS TAB 1 TAB: TAB at 08:59

## 2018-05-17 RX ADMIN — LEVETIRACETAM 1 MG: 250 TABLET, FILM COATED ORAL at 08:59

## 2018-05-17 RX ADMIN — ACETAMINOPHEN 1 MG: 325 TABLET ORAL at 20:19

## 2018-05-18 RX ADMIN — METOPROLOL TARTRATE 1 MG: 50 TABLET, FILM COATED ORAL at 07:46

## 2018-05-18 RX ADMIN — LEVETIRACETAM 1 MG: 250 TABLET, FILM COATED ORAL at 20:10

## 2018-05-18 RX ADMIN — Medication 1 MG: at 20:09

## 2018-05-18 RX ADMIN — LACOSAMIDE 1 MG: 50 TABLET, FILM COATED ORAL at 20:12

## 2018-05-18 RX ADMIN — Medication 1 MG: at 07:47

## 2018-05-18 RX ADMIN — DOCUSATE SODIUM,SENNOSIDES 1 TAB: 50; 8.6 TABLET, FILM COATED ORAL at 07:46

## 2018-05-18 RX ADMIN — ACETAMINOPHEN 1 MG: 325 TABLET ORAL at 14:02

## 2018-05-18 RX ADMIN — LEVETIRACETAM 1 MG: 250 TABLET, FILM COATED ORAL at 07:48

## 2018-05-18 RX ADMIN — MONTELUKAST SODIUM 1 MG: 10 TABLET, FILM COATED ORAL at 07:47

## 2018-05-18 RX ADMIN — TOLTERODINE 1 MG: 2 CAPSULE, EXTENDED RELEASE ORAL at 07:48

## 2018-05-18 RX ADMIN — ACETAMINOPHEN 1 MG: 325 TABLET ORAL at 07:48

## 2018-05-18 RX ADMIN — DOCUSATE SODIUM,SENNOSIDES 1 TAB: 50; 8.6 TABLET, FILM COATED ORAL at 20:12

## 2018-05-18 RX ADMIN — LACOSAMIDE 1 MG: 50 TABLET, FILM COATED ORAL at 07:46

## 2018-05-18 RX ADMIN — DICLOFENAC EPOLAMINE 1 PATCH: 0.01 SYSTEM TOPICAL at 20:09

## 2018-05-18 RX ADMIN — DICLOFENAC EPOLAMINE 1 PATCH: 0.01 SYSTEM TOPICAL at 07:48

## 2018-05-18 RX ADMIN — METOPROLOL TARTRATE 1 MG: 50 TABLET, FILM COATED ORAL at 20:13

## 2018-05-18 RX ADMIN — AMANTADINE HYDROCHLORIDE 1 MG: 100 CAPSULE ORAL at 07:47

## 2018-05-18 RX ADMIN — AMITRIPTYLINE HYDROCHLORIDE 1 MG: 50 TABLET, FILM COATED ORAL at 20:12

## 2018-05-18 RX ADMIN — Medication 1 UNITS: at 07:48

## 2018-05-18 RX ADMIN — MULTIPLE VITAMINS W/ MINERALS TAB 1 TAB: TAB at 07:47

## 2018-05-19 LAB
HEMATOCRIT: 34.3 % (ref 36–47)
HEMOGLOBIN: 11.7 G/DL (ref 12–15.5)
MEAN CORPUSCULAR HEMOGLOBIN: 32.8 PG (ref 27–33)
MEAN CORPUSCULAR HGB CONC: 34.1 G/DL (ref 32–36.5)
MEAN CORPUSCULAR VOLUME: 96.1 FL (ref 80–96)
NRBC BLD AUTO-RTO: 0 % (ref 0–0)
PLATELET COUNT, AUTOMATED: 543 10^3/UL (ref 150–450)
RED BLOOD COUNT: 3.57 10^6/UL (ref 4–5.4)
RED CELL DISTRIBUTION WIDTH: 12.7 % (ref 11.5–14.5)
WHITE BLOOD COUNT: 10.2 10^3/UL (ref 4–10)

## 2018-05-19 RX ADMIN — METOPROLOL TARTRATE 1 MG: 50 TABLET, FILM COATED ORAL at 09:50

## 2018-05-19 RX ADMIN — DICLOFENAC EPOLAMINE 1 PATCH: 0.01 SYSTEM TOPICAL at 09:46

## 2018-05-19 RX ADMIN — METOPROLOL TARTRATE 1 MG: 50 TABLET, FILM COATED ORAL at 20:22

## 2018-05-19 RX ADMIN — AMITRIPTYLINE HYDROCHLORIDE 1 MG: 50 TABLET, FILM COATED ORAL at 20:22

## 2018-05-19 RX ADMIN — DICLOFENAC EPOLAMINE 1 PATCH: 0.01 SYSTEM TOPICAL at 20:22

## 2018-05-19 RX ADMIN — LEVETIRACETAM 1 MG: 250 TABLET, FILM COATED ORAL at 09:46

## 2018-05-19 RX ADMIN — TOLTERODINE 1 MG: 2 CAPSULE, EXTENDED RELEASE ORAL at 09:47

## 2018-05-19 RX ADMIN — DOCUSATE SODIUM,SENNOSIDES 1 TAB: 50; 8.6 TABLET, FILM COATED ORAL at 09:47

## 2018-05-19 RX ADMIN — LACOSAMIDE 1 MG: 50 TABLET, FILM COATED ORAL at 09:47

## 2018-05-19 RX ADMIN — Medication 1 MG: at 09:46

## 2018-05-19 RX ADMIN — MONTELUKAST SODIUM 1 MG: 10 TABLET, FILM COATED ORAL at 09:47

## 2018-05-19 RX ADMIN — DOCUSATE SODIUM,SENNOSIDES 1 TAB: 50; 8.6 TABLET, FILM COATED ORAL at 20:22

## 2018-05-19 RX ADMIN — AMANTADINE HYDROCHLORIDE 1 MG: 100 CAPSULE ORAL at 09:47

## 2018-05-19 RX ADMIN — Medication 1 MG: at 20:22

## 2018-05-19 RX ADMIN — LACOSAMIDE 1 MG: 50 TABLET, FILM COATED ORAL at 20:21

## 2018-05-19 RX ADMIN — MULTIPLE VITAMINS W/ MINERALS TAB 1 TAB: TAB at 09:47

## 2018-05-19 RX ADMIN — LEVETIRACETAM 1 MG: 250 TABLET, FILM COATED ORAL at 20:21

## 2018-05-19 RX ADMIN — Medication 1 UNITS: at 09:47

## 2018-05-20 RX ADMIN — ACETAMINOPHEN 1 MG: 325 TABLET ORAL at 20:47

## 2018-05-20 RX ADMIN — METOPROLOL TARTRATE 1 MG: 50 TABLET, FILM COATED ORAL at 08:26

## 2018-05-20 RX ADMIN — METOPROLOL TARTRATE 1 MG: 50 TABLET, FILM COATED ORAL at 20:46

## 2018-05-20 RX ADMIN — Medication 1 UNITS: at 08:26

## 2018-05-20 RX ADMIN — DOCUSATE SODIUM,SENNOSIDES 1 TAB: 50; 8.6 TABLET, FILM COATED ORAL at 20:45

## 2018-05-20 RX ADMIN — DICLOFENAC EPOLAMINE 1 PATCH: 0.01 SYSTEM TOPICAL at 20:47

## 2018-05-20 RX ADMIN — MONTELUKAST SODIUM 1 MG: 10 TABLET, FILM COATED ORAL at 08:25

## 2018-05-20 RX ADMIN — Medication 1 MG: at 20:45

## 2018-05-20 RX ADMIN — LEVETIRACETAM 1 MG: 250 TABLET, FILM COATED ORAL at 20:45

## 2018-05-20 RX ADMIN — AMITRIPTYLINE HYDROCHLORIDE 1 MG: 50 TABLET, FILM COATED ORAL at 20:45

## 2018-05-20 RX ADMIN — LEVETIRACETAM 1 MG: 250 TABLET, FILM COATED ORAL at 08:26

## 2018-05-20 RX ADMIN — DICLOFENAC EPOLAMINE 1 PATCH: 0.01 SYSTEM TOPICAL at 08:27

## 2018-05-20 RX ADMIN — TOLTERODINE 1 MG: 2 CAPSULE, EXTENDED RELEASE ORAL at 08:26

## 2018-05-20 RX ADMIN — AMANTADINE HYDROCHLORIDE 1 MG: 100 CAPSULE ORAL at 08:25

## 2018-05-20 RX ADMIN — DOCUSATE SODIUM,SENNOSIDES 1 TAB: 50; 8.6 TABLET, FILM COATED ORAL at 08:26

## 2018-05-20 RX ADMIN — Medication 1 MG: at 08:25

## 2018-05-20 RX ADMIN — MULTIPLE VITAMINS W/ MINERALS TAB 1 TAB: TAB at 08:25

## 2018-05-20 RX ADMIN — LACOSAMIDE 1 MG: 50 TABLET, FILM COATED ORAL at 20:45

## 2018-05-20 RX ADMIN — LACOSAMIDE 1 MG: 50 TABLET, FILM COATED ORAL at 08:25

## 2018-05-21 RX ADMIN — Medication 1 UNITS: at 08:34

## 2018-05-21 RX ADMIN — DOCUSATE SODIUM,SENNOSIDES 1 TAB: 50; 8.6 TABLET, FILM COATED ORAL at 08:35

## 2018-05-21 RX ADMIN — LEVETIRACETAM 1 MG: 250 TABLET, FILM COATED ORAL at 08:33

## 2018-05-21 RX ADMIN — TOLTERODINE 1 MG: 2 CAPSULE, EXTENDED RELEASE ORAL at 08:34

## 2018-05-21 RX ADMIN — METOPROLOL TARTRATE 1 MG: 50 TABLET, FILM COATED ORAL at 08:34

## 2018-05-21 RX ADMIN — MULTIPLE VITAMINS W/ MINERALS TAB 1 TAB: TAB at 08:34

## 2018-05-21 RX ADMIN — AMANTADINE HYDROCHLORIDE 1 MG: 100 CAPSULE ORAL at 08:34

## 2018-05-21 RX ADMIN — ACETAMINOPHEN 1 MG: 325 TABLET ORAL at 08:35

## 2018-05-21 RX ADMIN — Medication 1 MG: at 08:34

## 2018-05-21 RX ADMIN — LACOSAMIDE 1 MG: 50 TABLET, FILM COATED ORAL at 08:33

## 2018-05-21 RX ADMIN — DICLOFENAC EPOLAMINE 1 PATCH: 0.01 SYSTEM TOPICAL at 08:34

## 2018-05-21 RX ADMIN — MONTELUKAST SODIUM 1 MG: 10 TABLET, FILM COATED ORAL at 08:34

## 2018-06-06 ENCOUNTER — HOSPITAL ENCOUNTER (OUTPATIENT)
Dept: HOSPITAL 53 - M ST | Age: 58
LOS: 24 days | End: 2018-06-30
Attending: NURSE PRACTITIONER
Payer: COMMERCIAL

## 2018-06-06 DIAGNOSIS — I63.9: Primary | ICD-10-CM

## 2018-06-06 DIAGNOSIS — R13.10: ICD-10-CM

## 2018-06-06 PROCEDURE — 92507 TX SP LANG VOICE COMM INDIV: CPT

## 2018-06-07 ENCOUNTER — HOSPITAL ENCOUNTER (EMERGENCY)
Dept: HOSPITAL 53 - M ED | Age: 58
Discharge: HOME | End: 2018-06-07
Payer: COMMERCIAL
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
## 2018-06-07 DIAGNOSIS — Z91.040: ICD-10-CM
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
## 2018-06-07 DIAGNOSIS — Z79.899: ICD-10-CM

## 2018-06-07 DIAGNOSIS — R56.9: ICD-10-CM

## 2018-06-07 DIAGNOSIS — Z87.820: ICD-10-CM
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
## 2018-06-07 DIAGNOSIS — F10.129: Primary | ICD-10-CM
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
## 2018-06-07 LAB
ANION GAP: 11 MEQ/L (ref 8–16)
BLOOD UREA NITROGEN: 13 MG/DL (ref 7–18)
CALCIUM LEVEL: 9.2 MG/DL (ref 8.5–10.1)
CARBON DIOXIDE LEVEL: 25 MEQ/L (ref 21–32)
CHLORIDE LEVEL: 112 MEQ/L (ref 98–107)
CREATININE FOR GFR: 0.8 MG/DL (ref 0.55–1.3)
ETHYL ALCOHOL (ETHANOL): 0.25 % (ref 0–0.01)
GFR SERPL CREATININE-BSD FRML MDRD: > 60 ML/MIN/{1.73_M2} (ref 51–?)
GLUCOSE, FASTING: 124 MG/DL (ref 70–100)
HEMATOCRIT: 35.7 % (ref 36–47)
HEMOGLOBIN: 12.2 G/DL (ref 12–15.5)
MEAN CORPUSCULAR HEMOGLOBIN: 31.3 PG (ref 27–33)
MEAN CORPUSCULAR HGB CONC: 34.2 G/DL (ref 32–36.5)
MEAN CORPUSCULAR VOLUME: 91.5 FL (ref 80–96)
NRBC BLD AUTO-RTO: 0 % (ref 0–0)
PLATELET COUNT, AUTOMATED: 379 10^3/UL (ref 150–450)
POTASSIUM SERUM: 5 MEQ/L (ref 3.5–5.1)
RED BLOOD COUNT: 3.9 10^6/UL (ref 4–5.4)
RED CELL DISTRIBUTION WIDTH: 12.2 % (ref 11.5–14.5)
SODIUM LEVEL: 148 MEQ/L (ref 136–145)
WHITE BLOOD COUNT: 6.9 10^3/UL (ref 4–10)

## 2018-06-07 PROCEDURE — 70450 CT HEAD/BRAIN W/O DYE: CPT

## 2018-07-02 ENCOUNTER — HOSPITAL ENCOUNTER (INPATIENT)
Dept: HOSPITAL 53 - M ED | Age: 58
LOS: 1 days | Discharge: HOME | DRG: 53 | End: 2018-07-03
Attending: INTERNAL MEDICINE | Admitting: GENERAL PRACTICE
Payer: COMMERCIAL

## 2018-07-02 DIAGNOSIS — F05: ICD-10-CM

## 2018-07-02 DIAGNOSIS — E55.9: ICD-10-CM

## 2018-07-02 DIAGNOSIS — G47.00: ICD-10-CM

## 2018-07-02 DIAGNOSIS — J45.909: ICD-10-CM

## 2018-07-02 DIAGNOSIS — E72.20: ICD-10-CM

## 2018-07-02 DIAGNOSIS — G40.909: Primary | ICD-10-CM

## 2018-07-02 DIAGNOSIS — Z86.73: ICD-10-CM

## 2018-07-02 DIAGNOSIS — I10: ICD-10-CM

## 2018-07-02 DIAGNOSIS — F32.9: ICD-10-CM

## 2018-07-02 DIAGNOSIS — F41.9: ICD-10-CM

## 2018-07-02 LAB
ALBUMIN/GLOBULIN RATIO: 1.03 (ref 1–1.93)
ALBUMIN: 4 GM/DL (ref 3.2–5.2)
ALKALINE PHOSPHATASE: 226 U/L (ref 45–117)
ALT SERPL W P-5'-P-CCNC: 54 U/L (ref 12–78)
AMMONIA PLAS-SCNC: 42 UMOL/L (ref ?–32)
AMMONIA PLAS-SCNC: 80 UMOL/L (ref ?–32)
AMPHETAMINES UR QL SCN: NEGATIVE
ANION GAP: 13 MEQ/L (ref 8–16)
APPEARANCE, CSF: CLEAR
APPEARANCE, CSF: CLEAR
APPEARANCE, URINE: CLEAR
AST SERPL-CCNC: 51 U/L (ref 7–37)
BACTERIA UR QL AUTO: NEGATIVE
BARBITURATES UR QL SCN: NEGATIVE
BASO #: 0.1 10^3/UL (ref 0–0.2)
BASO %: 0.6 % (ref 0–1)
BENZODIAZ UR QL SCN: POSITIVE
BILIRUB CONJ SERPL-MCNC: 0.3 MG/DL (ref 0–0.2)
BILIRUBIN, URINE AUTO: NEGATIVE
BILIRUBIN,TOTAL: 0.6 MG/DL (ref 0.2–1)
BLOOD UREA NITROGEN: 9 MG/DL (ref 7–18)
BLOOD, URINE BLOOD: NEGATIVE
BZE UR QL SCN: NEGATIVE
CALCIUM LEVEL: 9.5 MG/DL (ref 8.5–10.1)
CANNABINOIDS UR QL SCN: NEGATIVE
CARBON DIOXIDE LEVEL: 26 MEQ/L (ref 21–32)
CHLORIDE LEVEL: 101 MEQ/L (ref 98–107)
CK SERPL-CCNC: 239 U/L (ref 26–192)
COLOR, CSF: COLORLESS
COLOR, CSF: COLORLESS
CREATININE FOR GFR: 0.95 MG/DL (ref 0.55–1.3)
CSF DIFF IF INDICATED?: NO
CSF RBC: < 2 10^3/UL (ref ?–2)
CSF RBC: < 2 10^3/UL (ref ?–2)
CSF WBC: 1 /UL (ref 0–10)
CSF WBC: 2 /UL (ref 0–10)
EOS #: 0 10^3/UL (ref 0–0.5)
EOSINOPHIL NFR BLD AUTO: 0.1 % (ref 0–3)
ETHYL ALCOHOL (ETHANOL): < 0.003 % (ref 0–0.01)
GFR SERPL CREATININE-BSD FRML MDRD: > 60 ML/MIN/{1.73_M2} (ref 51–?)
GLUCOSE BLDC GLUCOMTR-MCNC: 263 MG/DL (ref 70–105)
GLUCOSE CSF: 91 MG/DL (ref 40–75)
GLUCOSE, FASTING: 254 MG/DL (ref 70–100)
GLUCOSE, URINE (UA) AUTO: (no result) MG/DL
HEMATOCRIT: 35.5 % (ref 36–47)
HEMOGLOBIN: 11.7 G/DL (ref 12–15.5)
IMMATURE GRANULOCYTE %: 0.5 % (ref 0–3)
KETONE, URINE AUTO: (no result) MG/DL
LACTIC ACID SEPSIS PROTOCOL: 1.7 MMOL/L (ref 0.4–2)
LEUKOCYTE ESTERASE UR QL STRIP.AUTO: NEGATIVE
LYMPH #: 1.5 10^3/UL (ref 1.5–4.5)
LYMPH %: 13.6 % (ref 24–44)
MEAN CORPUSCULAR HEMOGLOBIN: 29.3 PG (ref 27–33)
MEAN CORPUSCULAR HGB CONC: 33 G/DL (ref 32–36.5)
MEAN CORPUSCULAR VOLUME: 89 FL (ref 80–96)
METHADONE URINE: NEGATIVE
MONO #: 0.8 10^3/UL (ref 0–0.8)
MONO %: 6.8 % (ref 0–5)
MUCUS, URINE: (no result)
NEUTROPHILS #: 8.7 10^3/UL (ref 1.8–7.7)
NEUTROPHILS %: 78.4 % (ref 36–66)
NITRITE, URINE AUTO: NEGATIVE
NRBC BLD AUTO-RTO: 0 % (ref 0–0)
OPIATES UR QL SCN: NEGATIVE
PCP UR QL SCN: NEGATIVE
PH,URINE: 5 UNITS (ref 5–9)
PLATELET COUNT, AUTOMATED: 349 10^3/UL (ref 150–450)
POTASSIUM SERUM: 3.3 MEQ/L (ref 3.5–5.1)
PROLACTIN: 35.6 NG/ML
PROT UR QL STRIP.AUTO: (no result) MG/DL
RBC, URINE AUTO: 0 /HPF (ref 0–3)
RED BLOOD COUNT: 3.99 10^6/UL (ref 4–5.4)
RED CELL DISTRIBUTION WIDTH: 13.1 % (ref 11.5–14.5)
SODIUM LEVEL: 140 MEQ/L (ref 136–145)
SPECIFIC GRAVITY URINE AUTO: 1.02 (ref 1–1.03)
SPECIMEN SOURCE: (no result)
SPECIMEN SOURCE: (no result)
SQUAMOUS #/AREA URNS AUTO: 0 /HPF (ref 0–6)
TOTAL PROTEIN,CSF: 45.7 MG/DL (ref 15–45)
TOTAL PROTEIN: 7.9 GM/DL (ref 6.4–8.2)
TUBE # CSF: (no result)
TUBE # CSF: (no result)
UROBILINOGEN, URINE AUTO: 0.2 MG/DL (ref 0–2)
WBC, URINE AUTO: 1 /HPF (ref 0–3)
WHITE BLOOD COUNT: 11.1 10^3/UL (ref 4–10)

## 2018-07-02 RX ADMIN — DEXTROSE AND SODIUM CHLORIDE 1 MLS/HR: 5; 450 INJECTION, SOLUTION INTRAVENOUS at 09:26

## 2018-07-02 RX ADMIN — LACOSAMIDE 1 MG: 50 TABLET, FILM COATED ORAL at 11:49

## 2018-07-02 RX ADMIN — METOPROLOL TARTRATE 1 MG: 50 TABLET, FILM COATED ORAL at 11:48

## 2018-07-02 RX ADMIN — POTASSIUM CHLORIDE 1 MLS/HR: 7.46 INJECTION, SOLUTION INTRAVENOUS at 09:26

## 2018-07-02 RX ADMIN — AMITRIPTYLINE HYDROCHLORIDE 1 MG: 50 TABLET, FILM COATED ORAL at 20:07

## 2018-07-02 RX ADMIN — DEXTROSE MONOHYDRATE 1 MLS/HR: 50 INJECTION, SOLUTION INTRAVENOUS at 09:26

## 2018-07-02 RX ADMIN — LEVETIRACETAM 1 MLS/HR: 500 INJECTION, SOLUTION, CONCENTRATE INTRAVENOUS at 06:14

## 2018-07-02 RX ADMIN — LACTULOSE 1 ML: 10 SOLUTION ORAL at 09:27

## 2018-07-02 RX ADMIN — MULTIPLE VITAMINS W/ MINERALS TAB 1 TAB: TAB at 11:47

## 2018-07-02 RX ADMIN — LACOSAMIDE 1 MG: 50 TABLET, FILM COATED ORAL at 20:05

## 2018-07-02 RX ADMIN — PROPOFOL 1 MG: 10 INJECTION, EMULSION INTRAVENOUS at 06:11

## 2018-07-02 RX ADMIN — CEFTRIAXONE 1 MLS/HR: 1 INJECTION, POWDER, FOR SOLUTION INTRAMUSCULAR; INTRAVENOUS at 06:35

## 2018-07-02 RX ADMIN — LEVETIRACETAM 1 MG: 250 TABLET, FILM COATED ORAL at 20:06

## 2018-07-02 RX ADMIN — DOCUSATE SODIUM,SENNOSIDES 1 TAB: 50; 8.6 TABLET, FILM COATED ORAL at 11:48

## 2018-07-02 RX ADMIN — SODIUM CHLORIDE 1 MLS/HR: 9 INJECTION, SOLUTION INTRAVENOUS at 05:45

## 2018-07-02 RX ADMIN — Medication 1 UNITS: at 11:47

## 2018-07-02 RX ADMIN — DOCUSATE SODIUM,SENNOSIDES 1 TAB: 50; 8.6 TABLET, FILM COATED ORAL at 20:07

## 2018-07-02 RX ADMIN — AMANTADINE HYDROCHLORIDE 1 MG: 100 CAPSULE ORAL at 09:00

## 2018-07-02 RX ADMIN — MONTELUKAST SODIUM 1 MG: 10 TABLET, FILM COATED ORAL at 09:00

## 2018-07-02 RX ADMIN — METOPROLOL TARTRATE 1 MG: 50 TABLET, FILM COATED ORAL at 20:07

## 2018-07-02 RX ADMIN — SODIUM CHLORIDE 1 MLS/HR: 9 INJECTION, SOLUTION INTRAVENOUS at 06:45

## 2018-07-03 LAB
ANION GAP: 12 MEQ/L (ref 8–16)
BLOOD UREA NITROGEN: 5 MG/DL (ref 7–18)
CALCIUM LEVEL: 8.7 MG/DL (ref 8.5–10.1)
CARBON DIOXIDE LEVEL: 25 MEQ/L (ref 21–32)
CHLORIDE LEVEL: 105 MEQ/L (ref 98–107)
CREATININE FOR GFR: 0.71 MG/DL (ref 0.55–1.3)
GFR SERPL CREATININE-BSD FRML MDRD: > 60 ML/MIN/{1.73_M2} (ref 51–?)
GLUCOSE, FASTING: 99 MG/DL (ref 70–100)
HEMATOCRIT: 35.3 % (ref 36–47)
HEMOGLOBIN: 11.8 G/DL (ref 12–15.5)
MAGNESIUM LEVEL: 2 MG/DL (ref 1.8–2.4)
MEAN CORPUSCULAR HEMOGLOBIN: 28.9 PG (ref 27–33)
MEAN CORPUSCULAR HGB CONC: 33.4 G/DL (ref 32–36.5)
MEAN CORPUSCULAR VOLUME: 86.3 FL (ref 80–96)
NRBC BLD AUTO-RTO: 0 % (ref 0–0)
PLATELET COUNT, AUTOMATED: 328 10^3/UL (ref 150–450)
POTASSIUM SERUM: 3.1 MEQ/L (ref 3.5–5.1)
RED BLOOD COUNT: 4.09 10^6/UL (ref 4–5.4)
RED CELL DISTRIBUTION WIDTH: 12.8 % (ref 11.5–14.5)
SODIUM LEVEL: 142 MEQ/L (ref 136–145)
WHITE BLOOD COUNT: 5.8 10^3/UL (ref 4–10)

## 2018-07-03 RX ADMIN — POTASSIUM CHLORIDE 1 MEQ: 750 TABLET, EXTENDED RELEASE ORAL at 06:27

## 2018-07-03 RX ADMIN — Medication 1 UNITS: at 08:56

## 2018-07-03 RX ADMIN — LACOSAMIDE 1 MG: 50 TABLET, FILM COATED ORAL at 08:57

## 2018-07-03 RX ADMIN — AMANTADINE HYDROCHLORIDE 1 MG: 100 CAPSULE ORAL at 08:56

## 2018-07-03 RX ADMIN — MONTELUKAST SODIUM 1 MG: 10 TABLET, FILM COATED ORAL at 09:08

## 2018-07-03 RX ADMIN — MULTIPLE VITAMINS W/ MINERALS TAB 1 TAB: TAB at 08:56

## 2018-07-03 RX ADMIN — METOPROLOL TARTRATE 1 MG: 50 TABLET, FILM COATED ORAL at 08:57

## 2018-07-03 RX ADMIN — LEVETIRACETAM 1 MG: 250 TABLET, FILM COATED ORAL at 08:56

## 2018-07-03 RX ADMIN — DOCUSATE SODIUM,SENNOSIDES 1 TAB: 50; 8.6 TABLET, FILM COATED ORAL at 09:00

## 2018-07-12 ENCOUNTER — HOSPITAL ENCOUNTER (OUTPATIENT)
Dept: HOSPITAL 53 - M ST | Age: 58
LOS: 19 days | End: 2018-07-31
Attending: NURSE PRACTITIONER
Payer: COMMERCIAL

## 2018-07-12 DIAGNOSIS — I69.391: Primary | ICD-10-CM

## 2018-07-12 DIAGNOSIS — R13.10: ICD-10-CM

## 2018-07-12 PROCEDURE — 92507 TX SP LANG VOICE COMM INDIV: CPT

## 2018-07-20 ENCOUNTER — HOSPITAL ENCOUNTER (OUTPATIENT)
Dept: HOSPITAL 53 - M LAB REF | Age: 58
End: 2018-07-20
Attending: NURSE PRACTITIONER
Payer: COMMERCIAL

## 2018-07-20 DIAGNOSIS — R79.89: Primary | ICD-10-CM

## 2018-07-20 DIAGNOSIS — K76.0: ICD-10-CM

## 2018-07-20 LAB
ALBUMIN/GLOBULIN RATIO: 0.98 (ref 1–1.93)
ALBUMIN: 4 GM/DL (ref 3.2–5.2)
ALKALINE PHOSPHATASE: 226 U/L (ref 45–117)
ALT SERPL W P-5'-P-CCNC: 49 U/L (ref 12–78)
ANION GAP: 10 MEQ/L (ref 8–16)
AST SERPL-CCNC: 29 U/L (ref 7–37)
BILIRUBIN,TOTAL: 0.6 MG/DL (ref 0.2–1)
BLOOD UREA NITROGEN: 12 MG/DL (ref 7–18)
CALCIUM LEVEL: 9.7 MG/DL (ref 8.5–10.1)
CARBON DIOXIDE LEVEL: 26 MEQ/L (ref 21–32)
CHLORIDE LEVEL: 103 MEQ/L (ref 98–107)
CREATININE FOR GFR: 1 MG/DL (ref 0.55–1.3)
GFR SERPL CREATININE-BSD FRML MDRD: > 60 ML/MIN/{1.73_M2} (ref 51–?)
GLUCOSE, FASTING: 139 MG/DL (ref 70–100)
POTASSIUM SERUM: 3.6 MEQ/L (ref 3.5–5.1)
SODIUM LEVEL: 139 MEQ/L (ref 136–145)
TOTAL PROTEIN: 8.1 GM/DL (ref 6.4–8.2)

## 2018-07-20 PROCEDURE — 80053 COMPREHEN METABOLIC PANEL: CPT

## 2018-07-26 ENCOUNTER — HOSPITAL ENCOUNTER (OUTPATIENT)
Dept: HOSPITAL 53 - M LAB | Age: 58
End: 2018-07-26
Attending: NURSE PRACTITIONER
Payer: COMMERCIAL

## 2018-07-26 DIAGNOSIS — K76.0: Primary | ICD-10-CM

## 2018-07-26 DIAGNOSIS — R79.89: ICD-10-CM

## 2018-07-26 LAB — AMMONIA PLAS-SCNC: 35 UMOL/L (ref ?–32)

## 2018-07-26 PROCEDURE — 82140 ASSAY OF AMMONIA: CPT

## 2018-08-22 ENCOUNTER — HOSPITAL ENCOUNTER (OUTPATIENT)
Dept: HOSPITAL 53 - M ST | Age: 58
LOS: 9 days | End: 2018-08-31
Attending: NURSE PRACTITIONER
Payer: COMMERCIAL

## 2018-08-22 DIAGNOSIS — I63.9: Primary | ICD-10-CM

## 2018-08-22 DIAGNOSIS — F80.1: ICD-10-CM

## 2018-08-22 PROCEDURE — 96125 COGNITIVE TEST BY HC PRO: CPT

## 2018-09-04 ENCOUNTER — HOSPITAL ENCOUNTER (OUTPATIENT)
Dept: HOSPITAL 53 - M ST | Age: 58
LOS: 26 days | End: 2018-09-30
Attending: NURSE PRACTITIONER
Payer: COMMERCIAL

## 2018-09-04 DIAGNOSIS — Z51.89: Primary | ICD-10-CM

## 2018-09-04 DIAGNOSIS — F80.1: ICD-10-CM

## 2018-09-04 DIAGNOSIS — I63.9: ICD-10-CM

## 2018-09-13 ENCOUNTER — HOSPITAL ENCOUNTER (EMERGENCY)
Dept: HOSPITAL 53 - M ED | Age: 58
Discharge: HOME | End: 2018-09-13
Payer: COMMERCIAL

## 2018-09-13 DIAGNOSIS — I10: ICD-10-CM

## 2018-09-13 DIAGNOSIS — F33.9: ICD-10-CM

## 2018-09-13 DIAGNOSIS — Z79.899: ICD-10-CM

## 2018-09-13 DIAGNOSIS — F41.9: ICD-10-CM

## 2018-09-13 DIAGNOSIS — L30.9: Primary | ICD-10-CM

## 2018-09-13 DIAGNOSIS — Z91.040: ICD-10-CM

## 2018-09-13 DIAGNOSIS — J45.909: ICD-10-CM

## 2018-09-13 PROCEDURE — 99282 EMERGENCY DEPT VISIT SF MDM: CPT

## 2018-10-02 ENCOUNTER — HOSPITAL ENCOUNTER (OUTPATIENT)
Dept: HOSPITAL 53 - M ST | Age: 58
LOS: 29 days | End: 2018-10-31
Attending: NURSE PRACTITIONER
Payer: COMMERCIAL

## 2018-10-02 DIAGNOSIS — Z51.89: Primary | ICD-10-CM

## 2018-10-02 DIAGNOSIS — F80.1: ICD-10-CM

## 2018-10-02 DIAGNOSIS — I63.9: ICD-10-CM

## 2018-10-02 DIAGNOSIS — R13.10: ICD-10-CM

## 2019-02-28 ENCOUNTER — HOSPITAL ENCOUNTER (OUTPATIENT)
Dept: HOSPITAL 53 - M LAB | Age: 59
End: 2019-02-28
Attending: NURSE PRACTITIONER
Payer: COMMERCIAL

## 2019-02-28 DIAGNOSIS — Z13.9: Primary | ICD-10-CM

## 2019-02-28 LAB
ALBUMIN SERPL BCG-MCNC: 4 GM/DL (ref 3.2–5.2)
ALT SERPL W P-5'-P-CCNC: 93 U/L (ref 12–78)
BASOPHILS # BLD AUTO: 0.1 10^3/UL (ref 0–0.2)
BASOPHILS NFR BLD AUTO: 0.9 % (ref 0–1)
BILIRUB SERPL-MCNC: 0.3 MG/DL (ref 0.2–1)
BUN SERPL-MCNC: 13 MG/DL (ref 7–18)
CALCIUM SERPL-MCNC: 9.6 MG/DL (ref 8.5–10.1)
CHLORIDE SERPL-SCNC: 107 MEQ/L (ref 98–107)
CHOLEST SERPL-MCNC: 237 MG/DL (ref ?–200)
CHOLEST/HDLC SERPL: 2.08 {RATIO} (ref ?–5)
CO2 SERPL-SCNC: 30 MEQ/L (ref 21–32)
CREAT SERPL-MCNC: 0.65 MG/DL (ref 0.55–1.3)
EOSINOPHIL # BLD AUTO: 0.2 10^3/UL (ref 0–0.5)
EOSINOPHIL NFR BLD AUTO: 3.4 % (ref 0–3)
EST. AVERAGE GLUCOSE BLD GHB EST-MCNC: 114 MG/DL (ref 60–110)
GFR SERPL CREATININE-BSD FRML MDRD: > 60 ML/MIN/{1.73_M2} (ref 51–?)
GLUCOSE SERPL-MCNC: 75 MG/DL (ref 70–100)
HCT VFR BLD AUTO: 37.9 % (ref 36–47)
HDLC SERPL-MCNC: 114 MG/DL (ref 40–?)
HGB BLD-MCNC: 12.7 G/DL (ref 12–15.5)
LDLC SERPL CALC-MCNC: 99 MG/DL (ref ?–100)
LYMPHOCYTES # BLD AUTO: 2.6 10^3/UL (ref 1.5–4.5)
LYMPHOCYTES NFR BLD AUTO: 38 % (ref 24–44)
MCH RBC QN AUTO: 32.3 PG (ref 27–33)
MCHC RBC AUTO-ENTMCNC: 33.5 G/DL (ref 32–36.5)
MCV RBC AUTO: 96.4 FL (ref 80–96)
MONOCYTES # BLD AUTO: 0.7 10^3/UL (ref 0–0.8)
MONOCYTES NFR BLD AUTO: 9.7 % (ref 0–5)
NEUTROPHILS # BLD AUTO: 3.2 10^3/UL (ref 1.8–7.7)
NEUTROPHILS NFR BLD AUTO: 47.7 % (ref 36–66)
NONHDLC SERPL-MCNC: 123 MG/DL
PLATELET # BLD AUTO: 398 10^3/UL (ref 150–450)
POTASSIUM SERPL-SCNC: 4.5 MEQ/L (ref 3.5–5.1)
PROT SERPL-MCNC: 7.8 GM/DL (ref 6.4–8.2)
RBC # BLD AUTO: 3.93 10^6/UL (ref 4–5.4)
SODIUM SERPL-SCNC: 143 MEQ/L (ref 136–145)
TRIGL SERPL-MCNC: 118 MG/DL (ref ?–150)
TSH SERPL DL<=0.005 MIU/L-ACNC: 0.72 UIU/ML (ref 0.36–3.74)
WBC # BLD AUTO: 6.7 10^3/UL (ref 4–10)

## 2019-03-27 ENCOUNTER — HOSPITAL ENCOUNTER (EMERGENCY)
Dept: HOSPITAL 53 - M ED | Age: 59
Discharge: HOME | End: 2019-03-27
Payer: COMMERCIAL

## 2019-03-27 VITALS
BODY MASS INDEX: 26.72 KG/M2 | HEIGHT: 63 IN | SYSTOLIC BLOOD PRESSURE: 128 MMHG | DIASTOLIC BLOOD PRESSURE: 88 MMHG | WEIGHT: 150.8 LBS

## 2019-03-27 DIAGNOSIS — L20.9: Primary | ICD-10-CM

## 2019-03-27 DIAGNOSIS — Z79.899: ICD-10-CM

## 2019-03-27 DIAGNOSIS — F32.9: ICD-10-CM

## 2019-03-27 DIAGNOSIS — J45.909: ICD-10-CM

## 2019-03-27 DIAGNOSIS — R51: ICD-10-CM

## 2019-03-27 DIAGNOSIS — I63.9: ICD-10-CM

## 2019-03-27 DIAGNOSIS — Z91.040: ICD-10-CM

## 2019-04-16 ENCOUNTER — HOSPITAL ENCOUNTER (EMERGENCY)
Dept: HOSPITAL 53 - M ED | Age: 59
Discharge: ANOTHER HOSPITAL | End: 2019-04-16
Payer: COMMERCIAL

## 2019-04-16 VITALS — SYSTOLIC BLOOD PRESSURE: 118 MMHG | DIASTOLIC BLOOD PRESSURE: 82 MMHG

## 2019-04-16 VITALS — HEIGHT: 64 IN | BODY MASS INDEX: 23.94 KG/M2 | WEIGHT: 140.21 LBS

## 2019-04-16 DIAGNOSIS — J45.909: ICD-10-CM
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
## 2019-04-16 DIAGNOSIS — R41.82: ICD-10-CM

## 2019-04-16 DIAGNOSIS — S01.81XA: ICD-10-CM

## 2019-04-16 DIAGNOSIS — F41.9: ICD-10-CM
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
## 2019-04-16 DIAGNOSIS — Z79.899: ICD-10-CM

## 2019-04-16 DIAGNOSIS — S06.5X0A: Primary | ICD-10-CM

## 2019-04-16 DIAGNOSIS — Z86.73: ICD-10-CM

## 2019-04-16 DIAGNOSIS — S01.412A: ICD-10-CM

## 2019-04-16 DIAGNOSIS — F32.9: ICD-10-CM

## 2019-04-16 DIAGNOSIS — H54.62: ICD-10-CM

## 2019-04-16 DIAGNOSIS — I10: ICD-10-CM

## 2019-04-16 LAB
ALBUMIN SERPL BCG-MCNC: 4.3 GM/DL (ref 3.2–5.2)
ALT SERPL W P-5'-P-CCNC: 54 U/L (ref 12–78)
AMYLASE SERPL-CCNC: 68 U/L (ref 25–115)
APTT BLD: 21.5 SECONDS (ref 25.4–37.6)
BASOPHILS # BLD AUTO: 0 10^3/UL (ref 0–0.2)
BASOPHILS NFR BLD AUTO: 0.2 % (ref 0–1)
BILIRUB CONJ SERPL-MCNC: 0.2 MG/DL (ref 0–0.2)
BILIRUB SERPL-MCNC: 0.7 MG/DL (ref 0.2–1)
BUN SERPL-MCNC: 14 MG/DL (ref 7–18)
CALCIUM SERPL-MCNC: 9.5 MG/DL (ref 8.5–10.1)
CHLORIDE SERPL-SCNC: 102 MEQ/L (ref 98–107)
CK MB CFR.DF SERPL CALC: 1.61
CK MB SERPL-MCNC: 11 NG/ML (ref ?–3.6)
CK SERPL-CCNC: 682 U/L (ref 26–192)
CO2 SERPL-SCNC: 20 MEQ/L (ref 21–32)
CREAT SERPL-MCNC: 0.79 MG/DL (ref 0.55–1.3)
EOSINOPHIL # BLD AUTO: 0 10^3/UL (ref 0–0.5)
EOSINOPHIL NFR BLD AUTO: 0 % (ref 0–3)
ETHANOL SERPL-MCNC: 0.05 % (ref 0–0.01)
GFR SERPL CREATININE-BSD FRML MDRD: > 60 ML/MIN/{1.73_M2} (ref 51–?)
GLUCOSE SERPL-MCNC: 117 MG/DL (ref 70–100)
HCT VFR BLD AUTO: 31.9 % (ref 36–47)
HGB BLD-MCNC: 11.1 G/DL (ref 12–15.5)
INR PPP: 0.99
LIPASE SERPL-CCNC: 223 U/L (ref 73–393)
LYMPHOCYTES # BLD AUTO: 1 10^3/UL (ref 1.5–4.5)
LYMPHOCYTES NFR BLD AUTO: 5.9 % (ref 24–44)
MCH RBC QN AUTO: 33 PG (ref 27–33)
MCHC RBC AUTO-ENTMCNC: 34.8 G/DL (ref 32–36.5)
MCV RBC AUTO: 94.9 FL (ref 80–96)
MONOCYTES # BLD AUTO: 0.9 10^3/UL (ref 0–0.8)
MONOCYTES NFR BLD AUTO: 5.4 % (ref 0–5)
NEUTROPHILS # BLD AUTO: 15.3 10^3/UL (ref 1.8–7.7)
NEUTROPHILS NFR BLD AUTO: 87.9 % (ref 36–66)
PLATELET # BLD AUTO: 168 10^3/UL (ref 150–450)
POTASSIUM SERPL-SCNC: 3.4 MEQ/L (ref 3.5–5.1)
PROT SERPL-MCNC: 7.7 GM/DL (ref 6.4–8.2)
PROTHROMBIN TIME: 13.2 SECONDS (ref 12.1–14.4)
RBC # BLD AUTO: 3.36 10^6/UL (ref 4–5.4)
SODIUM SERPL-SCNC: 138 MEQ/L (ref 136–145)
TROPONIN I SERPL-MCNC: 0.02 NG/ML (ref ?–0.1)
WBC # BLD AUTO: 17.3 10^3/UL (ref 4–10)

## 2019-04-16 PROCEDURE — 96361 HYDRATE IV INFUSION ADD-ON: CPT

## 2019-04-16 PROCEDURE — 82550 ASSAY OF CK (CPK): CPT

## 2019-04-16 PROCEDURE — 86900 BLOOD TYPING SEROLOGIC ABO: CPT

## 2019-04-16 PROCEDURE — 82150 ASSAY OF AMYLASE: CPT

## 2019-04-16 PROCEDURE — 86850 RBC ANTIBODY SCREEN: CPT

## 2019-04-16 PROCEDURE — 83605 ASSAY OF LACTIC ACID: CPT

## 2019-04-16 PROCEDURE — 83690 ASSAY OF LIPASE: CPT

## 2019-04-16 PROCEDURE — 96374 THER/PROPH/DIAG INJ IV PUSH: CPT

## 2019-04-16 PROCEDURE — 71045 X-RAY EXAM CHEST 1 VIEW: CPT

## 2019-04-16 PROCEDURE — 85025 COMPLETE CBC W/AUTO DIFF WBC: CPT

## 2019-04-16 PROCEDURE — 86901 BLOOD TYPING SEROLOGIC RH(D): CPT

## 2019-04-16 PROCEDURE — 80048 BASIC METABOLIC PNL TOTAL CA: CPT

## 2019-04-16 PROCEDURE — 94760 N-INVAS EAR/PLS OXIMETRY 1: CPT

## 2019-04-16 PROCEDURE — 99291 CRITICAL CARE FIRST HOUR: CPT

## 2019-04-16 PROCEDURE — 82553 CREATINE MB FRACTION: CPT

## 2019-04-16 PROCEDURE — 72125 CT NECK SPINE W/O DYE: CPT

## 2019-04-16 PROCEDURE — 70450 CT HEAD/BRAIN W/O DYE: CPT

## 2019-04-16 PROCEDURE — 70486 CT MAXILLOFACIAL W/O DYE: CPT

## 2019-04-16 PROCEDURE — 80076 HEPATIC FUNCTION PANEL: CPT

## 2019-04-16 PROCEDURE — 85610 PROTHROMBIN TIME: CPT

## 2019-04-16 PROCEDURE — 93041 RHYTHM ECG TRACING: CPT

## 2019-04-16 PROCEDURE — 85730 THROMBOPLASTIN TIME PARTIAL: CPT

## 2019-04-16 NOTE — REP
Normal chest, 05:20 a.m., single AP upright view:

Comparison is 05/03/2018.

The lung fields are clear.  Cardiac size is normal.  The jose francisco, mediastinum,

skeletal structures are unremarkable.

Impression:

There are no acute cardiopulmonary findings.

 

 

Electronically Signed by

Faizan Wen MD 04/16/2019 07:48 A
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
## 2019-07-18 ENCOUNTER — HOSPITAL ENCOUNTER (OUTPATIENT)
Dept: HOSPITAL 53 - M RAD | Age: 59
End: 2019-07-18
Attending: NURSE PRACTITIONER
Payer: COMMERCIAL

## 2019-07-18 DIAGNOSIS — Z13.9: Primary | ICD-10-CM

## 2019-07-18 DIAGNOSIS — R92.2: ICD-10-CM

## 2019-07-18 DIAGNOSIS — Z78.0: ICD-10-CM

## 2019-07-18 NOTE — REPMRS
Patient History

The patient states she has not had a clinical breast exam in over

a year. Patient is postmenopausal.

No known family history of cancer.

 

Digital Mammo Screening Bilat: July 18, 2019 - Exam #: 

[id number]

Bilateral CC and MLO view(s) were taken.

 

Technologist: Dimple Arnold, Technologist

No prior studies available for comparison.

 

FINDINGS: There are scattered fibroglandular densities.  There is

no evidence of dominant mass, architectural distortion, or 

grouped microcalcification typical of malignancy.

3-D tomosynthesis shows no additional findings.

 

 

Assessment: BI-RADS/ACR category 1 mammogram. Negative Mammogram.

 

Recommendation

Routine screening mammogram of both breasts in 1 year (for women 

over age 40).

This patient's Lifetime Breast Cancer RIsk is estimated at 8.4 %.

This mammogram was interpreted with the aid of an FDA-approved 

computer-aided dectection system.

 

Electronically Signed By: Julio Thompson MD 07/18/19 [id number]

## 2019-07-25 ENCOUNTER — HOSPITAL ENCOUNTER (OUTPATIENT)
Dept: HOSPITAL 53 - M LAB REF | Age: 59
End: 2019-07-25
Attending: NURSE PRACTITIONER
Payer: COMMERCIAL

## 2019-07-25 DIAGNOSIS — Z13.9: Primary | ICD-10-CM

## 2019-07-25 LAB
ALBUMIN SERPL BCG-MCNC: 4.3 GM/DL (ref 3.2–5.2)
ALT SERPL W P-5'-P-CCNC: 44 U/L (ref 12–78)
BASOPHILS # BLD AUTO: 0 10^3/UL (ref 0–0.2)
BASOPHILS NFR BLD AUTO: 0.5 % (ref 0–1)
BILIRUB SERPL-MCNC: 0.4 MG/DL (ref 0.2–1)
BUN SERPL-MCNC: 17 MG/DL (ref 7–18)
CALCIUM SERPL-MCNC: 10 MG/DL (ref 8.5–10.1)
CHLORIDE SERPL-SCNC: 108 MEQ/L (ref 98–107)
CO2 SERPL-SCNC: 27 MEQ/L (ref 21–32)
CREAT SERPL-MCNC: 0.78 MG/DL (ref 0.55–1.3)
EOSINOPHIL # BLD AUTO: 0.1 10^3/UL (ref 0–0.5)
EOSINOPHIL NFR BLD AUTO: 0.7 % (ref 0–3)
EST. AVERAGE GLUCOSE BLD GHB EST-MCNC: 120 MG/DL (ref 60–110)
GFR SERPL CREATININE-BSD FRML MDRD: > 60 ML/MIN/{1.73_M2} (ref 51–?)
GLUCOSE SERPL-MCNC: 107 MG/DL (ref 70–100)
HCT VFR BLD AUTO: 39.3 % (ref 36–47)
HGB BLD-MCNC: 13.2 G/DL (ref 12–15.5)
LYMPHOCYTES # BLD AUTO: 2.2 10^3/UL (ref 1.5–4.5)
LYMPHOCYTES NFR BLD AUTO: 29.4 % (ref 24–44)
MCH RBC QN AUTO: 31.2 PG (ref 27–33)
MCHC RBC AUTO-ENTMCNC: 33.6 G/DL (ref 32–36.5)
MCV RBC AUTO: 92.9 FL (ref 80–96)
MONOCYTES # BLD AUTO: 0.5 10^3/UL (ref 0–0.8)
MONOCYTES NFR BLD AUTO: 6.8 % (ref 0–5)
NEUTROPHILS # BLD AUTO: 4.7 10^3/UL (ref 1.8–7.7)
NEUTROPHILS NFR BLD AUTO: 62.2 % (ref 36–66)
PLATELET # BLD AUTO: 404 10^3/UL (ref 150–450)
POTASSIUM SERPL-SCNC: 3.8 MEQ/L (ref 3.5–5.1)
PROT SERPL-MCNC: 8.1 GM/DL (ref 6.4–8.2)
RBC # BLD AUTO: 4.23 10^6/UL (ref 4–5.4)
SODIUM SERPL-SCNC: 141 MEQ/L (ref 136–145)
TSH SERPL DL<=0.005 MIU/L-ACNC: 0.79 UIU/ML (ref 0.36–3.74)
WBC # BLD AUTO: 7.5 10^3/UL (ref 4–10)

## 2019-09-27 ENCOUNTER — HOSPITAL ENCOUNTER (EMERGENCY)
Dept: HOSPITAL 53 - M ED | Age: 59
Discharge: HOME | End: 2019-09-27
Payer: COMMERCIAL

## 2019-09-27 VITALS — DIASTOLIC BLOOD PRESSURE: 83 MMHG | SYSTOLIC BLOOD PRESSURE: 120 MMHG

## 2019-09-27 VITALS — BODY MASS INDEX: 26.2 KG/M2 | WEIGHT: 153.44 LBS | HEIGHT: 64 IN

## 2019-09-27 DIAGNOSIS — I10: ICD-10-CM

## 2019-09-27 DIAGNOSIS — Z86.73: ICD-10-CM

## 2019-09-27 DIAGNOSIS — F41.9: ICD-10-CM

## 2019-09-27 DIAGNOSIS — F32.9: ICD-10-CM

## 2019-09-27 DIAGNOSIS — L30.9: Primary | ICD-10-CM

## 2019-09-27 DIAGNOSIS — Z91.040: ICD-10-CM

## 2019-09-27 DIAGNOSIS — H54.62: ICD-10-CM

## 2019-09-27 DIAGNOSIS — J45.909: ICD-10-CM

## 2019-09-27 DIAGNOSIS — Z79.899: ICD-10-CM

## 2019-09-27 DIAGNOSIS — R56.9: ICD-10-CM

## 2019-09-27 DIAGNOSIS — R51: ICD-10-CM

## 2019-12-02 ENCOUNTER — HOSPITAL ENCOUNTER (OUTPATIENT)
Dept: HOSPITAL 53 - M LAB REF | Age: 59
End: 2019-12-02
Attending: NURSE PRACTITIONER
Payer: COMMERCIAL

## 2019-12-02 DIAGNOSIS — Z01.812: Primary | ICD-10-CM

## 2019-12-02 LAB
ALBUMIN SERPL BCG-MCNC: 4.1 GM/DL (ref 3.2–5.2)
ALT SERPL W P-5'-P-CCNC: 35 U/L (ref 12–78)
BASOPHILS # BLD AUTO: 0 10^3/UL (ref 0–0.2)
BASOPHILS NFR BLD AUTO: 0.5 % (ref 0–1)
BILIRUB SERPL-MCNC: 0.6 MG/DL (ref 0.2–1)
BUN SERPL-MCNC: 8 MG/DL (ref 7–18)
CALCIUM SERPL-MCNC: 9.7 MG/DL (ref 8.5–10.1)
CHLORIDE SERPL-SCNC: 102 MEQ/L (ref 98–107)
CO2 SERPL-SCNC: 26 MEQ/L (ref 21–32)
CREAT SERPL-MCNC: 0.7 MG/DL (ref 0.55–1.3)
EOSINOPHIL # BLD AUTO: 0.1 10^3/UL (ref 0–0.5)
EOSINOPHIL NFR BLD AUTO: 0.8 % (ref 0–3)
GFR SERPL CREATININE-BSD FRML MDRD: > 60 ML/MIN/{1.73_M2} (ref 51–?)
GLUCOSE SERPL-MCNC: 108 MG/DL (ref 70–100)
HCT VFR BLD AUTO: 40.5 % (ref 36–47)
HGB BLD-MCNC: 13.4 G/DL (ref 12–15.5)
LYMPHOCYTES # BLD AUTO: 1.5 10^3/UL (ref 1.5–5)
LYMPHOCYTES NFR BLD AUTO: 24.8 % (ref 24–44)
MCH RBC QN AUTO: 31.1 PG (ref 27–33)
MCHC RBC AUTO-ENTMCNC: 33.1 G/DL (ref 32–36.5)
MCV RBC AUTO: 94 FL (ref 80–96)
MONOCYTES # BLD AUTO: 0.6 10^3/UL (ref 0–0.8)
MONOCYTES NFR BLD AUTO: 10.1 % (ref 0–5)
NEUTROPHILS # BLD AUTO: 3.8 10^3/UL (ref 1.5–8.5)
NEUTROPHILS NFR BLD AUTO: 63.5 % (ref 36–66)
PLATELET # BLD AUTO: 317 10^3/UL (ref 150–450)
POTASSIUM SERPL-SCNC: 3.5 MEQ/L (ref 3.5–5.1)
PROT SERPL-MCNC: 7.9 GM/DL (ref 6.4–8.2)
RBC # BLD AUTO: 4.31 10^6/UL (ref 4–5.4)
SODIUM SERPL-SCNC: 138 MEQ/L (ref 136–145)
WBC # BLD AUTO: 6 10^3/UL (ref 4–10)

## 2020-03-11 ENCOUNTER — HOSPITAL ENCOUNTER (OUTPATIENT)
Dept: HOSPITAL 53 - M LAB REF | Age: 60
End: 2020-03-11
Attending: NURSE PRACTITIONER
Payer: COMMERCIAL

## 2020-03-11 DIAGNOSIS — Z13.9: Primary | ICD-10-CM

## 2020-03-11 LAB — EST. AVERAGE GLUCOSE BLD GHB EST-MCNC: 111 MG/DL (ref 60–110)

## 2020-06-19 ENCOUNTER — HOSPITAL ENCOUNTER (EMERGENCY)
Dept: HOSPITAL 53 - M ED | Age: 60
Discharge: HOME | End: 2020-06-19
Payer: MEDICAID

## 2020-06-19 VITALS — WEIGHT: 157.19 LBS | BODY MASS INDEX: 27.85 KG/M2 | HEIGHT: 63 IN

## 2020-06-19 VITALS — SYSTOLIC BLOOD PRESSURE: 120 MMHG | DIASTOLIC BLOOD PRESSURE: 76 MMHG

## 2020-06-19 DIAGNOSIS — L56.2: Primary | ICD-10-CM

## 2021-06-14 ENCOUNTER — HOSPITAL ENCOUNTER (OUTPATIENT)
Dept: HOSPITAL 53 - M ED | Age: 61
Setting detail: OBSERVATION
LOS: 2 days | Discharge: HOME | End: 2021-06-16
Admitting: INTERNAL MEDICINE
Payer: MEDICAID

## 2021-06-14 VITALS — WEIGHT: 136.91 LBS | HEIGHT: 64 IN | BODY MASS INDEX: 23.37 KG/M2

## 2021-06-14 VITALS — DIASTOLIC BLOOD PRESSURE: 77 MMHG | SYSTOLIC BLOOD PRESSURE: 118 MMHG

## 2021-06-14 DIAGNOSIS — J45.909: ICD-10-CM

## 2021-06-14 DIAGNOSIS — E78.49: ICD-10-CM

## 2021-06-14 DIAGNOSIS — N32.81: ICD-10-CM

## 2021-06-14 DIAGNOSIS — E87.1: ICD-10-CM

## 2021-06-14 DIAGNOSIS — F32.9: ICD-10-CM

## 2021-06-14 DIAGNOSIS — Z91.040: ICD-10-CM

## 2021-06-14 DIAGNOSIS — R11.2: Primary | ICD-10-CM

## 2021-06-14 DIAGNOSIS — K76.0: ICD-10-CM

## 2021-06-14 DIAGNOSIS — E87.6: ICD-10-CM

## 2021-06-14 DIAGNOSIS — G40.909: ICD-10-CM

## 2021-06-14 DIAGNOSIS — I10: ICD-10-CM

## 2021-06-14 DIAGNOSIS — F12.10: ICD-10-CM

## 2021-06-14 DIAGNOSIS — R65.10: ICD-10-CM

## 2021-06-14 LAB
ALBUMIN SERPL BCG-MCNC: 4 GM/DL (ref 3.2–5.2)
ALT SERPL W P-5'-P-CCNC: 21 U/L (ref 12–78)
BASOPHILS # BLD AUTO: 0.1 10^3/UL (ref 0–0.2)
BASOPHILS NFR BLD AUTO: 0.7 % (ref 0–1)
BILIRUB CONJ SERPL-MCNC: 0.3 MG/DL (ref 0–0.2)
BILIRUB SERPL-MCNC: 0.6 MG/DL (ref 0.2–1)
BUN SERPL-MCNC: 14 MG/DL (ref 7–18)
CALCIUM SERPL-MCNC: 10.8 MG/DL (ref 8.5–10.1)
CHLORIDE SERPL-SCNC: 84 MEQ/L (ref 98–107)
CO2 SERPL-SCNC: 29 MEQ/L (ref 21–32)
CREAT SERPL-MCNC: 1 MG/DL (ref 0.55–1.3)
EOSINOPHIL # BLD AUTO: 0.2 10^3/UL (ref 0–0.5)
EOSINOPHIL NFR BLD AUTO: 1.2 % (ref 0–3)
ETHANOL SERPL-MCNC: 0.01 % (ref 0–0.01)
GFR SERPL CREATININE-BSD FRML MDRD: > 60 ML/MIN/{1.73_M2} (ref 51–?)
GLUCOSE SERPL-MCNC: 128 MG/DL (ref 70–100)
HBV CORE IGM SER QL: NEGATIVE
HBV SURFACE AB SER-ACNC: NEGATIVE M[IU]/ML
HCT VFR BLD AUTO: 41.2 % (ref 36–47)
HCV AB SER QL: < 0 INDEX (ref ?–0.8)
HEPATITIS A ANTIBODY IGM: NEGATIVE
HGB BLD-MCNC: 14.7 G/DL (ref 12–15.5)
LIPASE SERPL-CCNC: 1572 U/L (ref 73–393)
LYMPHOCYTES # BLD AUTO: 2.2 10^3/UL (ref 1.5–5)
LYMPHOCYTES NFR BLD AUTO: 17.5 % (ref 24–44)
MCH RBC QN AUTO: 33.7 PG (ref 27–33)
MCHC RBC AUTO-ENTMCNC: 35.7 G/DL (ref 32–36.5)
MCV RBC AUTO: 94.5 FL (ref 80–96)
MONOCYTES # BLD AUTO: 1.6 10^3/UL (ref 0–0.8)
MONOCYTES NFR BLD AUTO: 12.6 % (ref 2–8)
NEUTROPHILS # BLD AUTO: 8.6 10^3/UL (ref 1.5–8.5)
NEUTROPHILS NFR BLD AUTO: 67.4 % (ref 36–66)
PLATELET # BLD AUTO: 550 10^3/UL (ref 150–450)
POTASSIUM SERPL-SCNC: 2.5 MEQ/L (ref 3.5–5.1)
PROT SERPL-MCNC: 8.5 GM/DL (ref 6.4–8.2)
RBC # BLD AUTO: 4.36 10^6/UL (ref 4–5.4)
RSV RNA NPH QL NAA+PROBE: NEGATIVE
SODIUM SERPL-SCNC: 126 MEQ/L (ref 136–145)
WBC # BLD AUTO: 12.8 10^3/UL (ref 4–10)

## 2021-06-14 PROCEDURE — 70450 CT HEAD/BRAIN W/O DYE: CPT

## 2021-06-14 PROCEDURE — 83970 ASSAY OF PARATHORMONE: CPT

## 2021-06-14 PROCEDURE — 85027 COMPLETE CBC AUTOMATED: CPT

## 2021-06-14 PROCEDURE — 93005 ELECTROCARDIOGRAM TRACING: CPT

## 2021-06-14 PROCEDURE — 84443 ASSAY THYROID STIM HORMONE: CPT

## 2021-06-14 PROCEDURE — 85025 COMPLETE CBC W/AUTO DIFF WBC: CPT

## 2021-06-14 PROCEDURE — 80053 COMPREHEN METABOLIC PANEL: CPT

## 2021-06-14 PROCEDURE — 96361 HYDRATE IV INFUSION ADD-ON: CPT

## 2021-06-14 PROCEDURE — 80307 DRUG TEST PRSMV CHEM ANLYZR: CPT

## 2021-06-14 PROCEDURE — 84300 ASSAY OF URINE SODIUM: CPT

## 2021-06-14 PROCEDURE — 76705 ECHO EXAM OF ABDOMEN: CPT

## 2021-06-14 PROCEDURE — 83930 ASSAY OF BLOOD OSMOLALITY: CPT

## 2021-06-14 PROCEDURE — 74177 CT ABD & PELVIS W/CONTRAST: CPT

## 2021-06-14 PROCEDURE — 83690 ASSAY OF LIPASE: CPT

## 2021-06-14 PROCEDURE — 81001 URINALYSIS AUTO W/SCOPE: CPT

## 2021-06-14 PROCEDURE — 86803 HEPATITIS C AB TEST: CPT

## 2021-06-14 PROCEDURE — 36415 COLL VENOUS BLD VENIPUNCTURE: CPT

## 2021-06-14 PROCEDURE — 82977 ASSAY OF GGT: CPT

## 2021-06-14 PROCEDURE — 87631 RESP VIRUS 3-5 TARGETS: CPT

## 2021-06-14 PROCEDURE — 80048 BASIC METABOLIC PNL TOTAL CA: CPT

## 2021-06-14 PROCEDURE — 90471 IMMUNIZATION ADMIN: CPT

## 2021-06-14 PROCEDURE — 86705 HEP B CORE ANTIBODY IGM: CPT

## 2021-06-14 PROCEDURE — 83935 ASSAY OF URINE OSMOLALITY: CPT

## 2021-06-14 PROCEDURE — 82077 ASSAY SPEC XCP UR&BREATH IA: CPT

## 2021-06-14 PROCEDURE — 83735 ASSAY OF MAGNESIUM: CPT

## 2021-06-14 PROCEDURE — 96374 THER/PROPH/DIAG INJ IV PUSH: CPT

## 2021-06-14 PROCEDURE — 87340 HEPATITIS B SURFACE AG IA: CPT

## 2021-06-14 PROCEDURE — 86709 HEPATITIS A IGM ANTIBODY: CPT

## 2021-06-14 PROCEDURE — 90682 RIV4 VACC RECOMBINANT DNA IM: CPT

## 2021-06-14 PROCEDURE — 87040 BLOOD CULTURE FOR BACTERIA: CPT

## 2021-06-14 PROCEDURE — 83605 ASSAY OF LACTIC ACID: CPT

## 2021-06-14 PROCEDURE — 99285 EMERGENCY DEPT VISIT HI MDM: CPT

## 2021-06-14 PROCEDURE — 80076 HEPATIC FUNCTION PANEL: CPT

## 2021-06-14 PROCEDURE — 96376 TX/PRO/DX INJ SAME DRUG ADON: CPT

## 2021-06-14 RX ADMIN — LEVETIRACETAM SCH MG: 250 TABLET, FILM COATED ORAL at 21:56

## 2021-06-14 RX ADMIN — DEXTROSE MONOHYDRATE SCH MG: 50 INJECTION, SOLUTION INTRAVENOUS at 23:58

## 2021-06-14 RX ADMIN — POTASSIUM CHLORIDE AND SODIUM CHLORIDE SCH MLS/HR: 900; 300 INJECTION, SOLUTION INTRAVENOUS at 21:56

## 2021-06-14 NOTE — REPVR
PROCEDURE INFORMATION: 

Exam: CT Abdomen And Pelvis With Contrast 

Exam date and time: 6/14/2021 7:28 PM 

Age: 60 years old 

Clinical indication: Condition or disease; Pancreatic condition; Pancreatitis 



TECHNIQUE: 

Imaging protocol: Computed tomography of the abdomen and pelvis with contrast. 

Radiation optimization: All CT scans at this facility use at least one of these 

dose optimization techniques: automated exposure control; mA and/or kV 

adjustment per patient size (includes targeted exams where dose is matched to 

clinical indication); or iterative reconstruction. 

Contrast material: ISOVUE 370; Contrast volume: 100 ml; Contrast route: 

INTRAVENOUS (IV);  



COMPARISON: 

No relevant prior studies available. 



FINDINGS: 

Liver: There is hepatomegaly and hepatic steatosis. 

Gallbladder and bile ducts: Gallbladder is distended. No gallbladder wall 

calculi or biliary duct dilation. 

Pancreas: Normal. No ductal dilation. 

Spleen: Normal. No splenomegaly. 

Adrenal glands: Normal. No mass. 

Kidneys and ureters: Unremarkable. No calculi or hydronephrosis. 

Stomach and bowel: There is colonic diverticulosis without evidence of 

diverticulitis. The small bowel is unremarkable. No bowel obstruction. 

Appendix: No evidence of appendicitis. 



Intraperitoneal space: No free air. No significant fluid collection. 

Vasculature: Unremarkable. No abdominal aortic aneurysm. 

Lymph nodes: Unremarkable. No enlarged lymph nodes. 

Urinary bladder: Unremarkable as visualized. 

Reproductive: Unremarkable as visualized. 

Bones/joints: There are degenerative changes in the spine and pelvis. 

Soft tissues: Unremarkable. 



IMPRESSION: 

1. Hepatic steatosis and hepatomegaly. 

2. Colonic diverticulosis without evidence of diverticulitis. 

3. Distended gallbladder without secondary signs of cholecystitis. Consider 

gallbladder ultrasound follow-up if there is concern for acute cholecystitis. 



Electronically signed by: Mikhail Mendoza On 06/14/2021  20:35:58 PM

## 2021-06-14 NOTE — IPNPDOC
Text Note


Date of Service


The patient was seen on 6/14/21.





VS,Fishbone, I+O


VS, Fishbone, I+O


Laboratory Tests


6/14/21 18:10











Vital Signs








  Date Time  Temp Pulse Resp B/P (MAP) Pulse Ox O2 Delivery O2 Flow Rate FiO2


 


6/14/21 22:15  100 16 114/75 (88) 97 Room Air  


 


6/14/21 16:43 97.5       

















DAKOTA CORTEZ MD                Jun 14, 2021 22:26

## 2021-06-14 NOTE — REPVR
PROCEDURE INFORMATION: 

Exam: CT Head Without Contrast 

Exam date and time: 6/14/2021 7:28 PM 

Age: 60 years old 

Clinical indication: Other: N/v; Additional info: HX remote sdh, nausea and 

vomiting 



TECHNIQUE: 

Imaging protocol: Computed tomography of the head without contrast. 

Radiation optimization: All CT scans at this facility use at least one of these 

dose optimization techniques: automated exposure control; mA and/or kV 

adjustment per patient size (includes targeted exams where dose is matched to 

clinical indication); or iterative reconstruction. 



COMPARISON: 

CT Head without contrast 4/16/2019 4:12 AM 

MRI-Brain without Contrast 7/2/2018 12:31:53 PM



FINDINGS: 

Brain: The brain demonstrates mild generalized volume loss, prominent for age. 

No hemorrhage or edema seen. 

Cerebral ventricles: The ventricles are prominent, stable compared to the prior 

study. 

Paranasal sinuses: Visualized sinuses are unremarkable. No fluid levels. 

Mastoid air cells: Visualized mastoid air cells are well aerated. 

Bones/joints: Unremarkable. No acute fracture. 

Soft tissues: Unremarkable. 



IMPRESSION: 

1. No acute intracranial abnormality seen. 

2. Ventricular enlargement is again demonstrated which may reflect volume loss 

though a component of normal pressure hydrocephalus is possible. 



Electronically signed by: Ashley Jensen On 06/14/2021  20:26:11 PM

## 2021-06-14 NOTE — HPEPDOC
[facility] Medical History & Physical


Date of Admission


Jun 14, 2021


Date of Service:  Jun 14, 2021


Attending Physician:  DAKOTA CORTEZ MD





History and Physical


CHIEF COMPLAINT: [59 y/o female c/o n/v x2 weeks]





HISTORY OF PRESENT ILLNESS: [This is a 59 y/o female with a pmh of cva x2, 

seizures, hld, htn, asthma who reports to the ED with a cc of nausea and 

vomiting x2 weeks. Patient states that her symptoms came on suddenly and 

believes they may have improved somewhat, but still feels very ill. Patient s

tates that she is vomiting approx 3 times a day and lately has noted some blood 

in the emesis. Patient states that nothing seems to bring on the episodes of 

vomiting and they simply happen. Patient states that she thinks her symptoms are

 partially due to stress because she has been trying to move back home to 

[place] and has run into issues with that. Patient denies fever, chills, abd 

pain, constipation, diarrhea, headaches. Patient admits to recent poor oral 

intake.]





PAST MEDICAL HISTORY:


1. [See HPI





PAST SURGICAL HISTORY:


1. [Unspecified ortho surgery to foot].





SOCIAL HISTORY:


Tobacco use:[Denies] 


ETOH: [Denies]


Illicit drug use: [Denies]





FAMILY HISTORY:


Reviewed - none pertinent





ALLERGIES: Please see below.





REVIEW OF SYSTEMS:


CONSTITUTIONAL: [See HPI].


HEENT: [Denies URI sx].


CARDIOVASCULAR: [Denies chest pain, palpitations].


RESPIRATORY: [Denies sob, wheezing].


GASTROINTESTINAL: [See HPI].


GENITOURINARY: [Denies dysuria].


SKIN: [Denies rash].


MUSCULOSKELETAL: [Denies acute joint/back pain].


NEUROLOGICAL: [Denies syncope, paresthesias].


ENDOCRINE: [Denies hx of DM].


HEMATOLOGIC/LYMPHATIC: [Denies easy bruising].





HOME MEDICATIONS: Please see below. 





PHYSICAL EXAMINATION:


VITAL SIGNS: Please see below.


GENERAL APPEARANCE: [This is a fatigued appearing 59 y/o male. She does not 

appear to be in any acute respiratory distress.]. 


HEENT: [No mass or lesion. EOMI. No scleral icterus. Nares patent. Oral mucosa 

dry without erythema.].


CARDIOVASCULAR: [Tachy rate, regular rhythm. No murmurs, rubs, gallops].


LUNGS: [Good air flow appreciated b/l. No wheezing, rales, rhonchi.].


ABDOMEN: [Soft, nontender].


MUSCULOSKELETAL: [No joint deformity].


EXTREMITIES: [No peripheral edema. No overlying skin changes. Pulses intact.].


NEUROLOGICAL: [Speech clear. A+Ox3. No focal deficits.].


PSYCHIATRIC: [Mood and affect appear appropriate.].





LABORATORY DATA: See below.





IMAGING: [CT Abd/pelvis: FINDINGS: 


Liver: There is hepatomegaly and hepatic steatosis. 


Gallbladder and bile ducts: Gallbladder is distended. No gallbladder wall 


calculi or biliary duct dilation. 


Pancreas: Normal. No ductal dilation. 


Spleen: Normal. No splenomegaly. 


Adrenal glands: Normal. No mass. 


Kidneys and ureters: Unremarkable. No calculi or hydronephrosis. 


Stomach and bowel: There is colonic diverticulosis without evidence of 


diverticulitis. The small bowel is unremarkable. No bowel obstruction. 


Appendix: No evidence of appendicitis. 





Intraperitoneal space: No free air. No significant fluid collection. 


Vasculature: Unremarkable. No abdominal aortic aneurysm. 


Lymph nodes: Unremarkable. No enlarged lymph nodes. 


Urinary bladder: Unremarkable as visualized. 


Reproductive: Unremarkable as visualized. 


Bones/joints: There are degenerative changes in the spine and pelvis. 


Soft tissues: Unremarkable. 





IMPRESSION: 


1. Hepatic steatosis and hepatomegaly. 


2. Colonic diverticulosis without evidence of diverticulitis. 


3. Distended gallbladder without secondary signs of cholecystitis. Consider 


gallbladder ultrasound follow-up if there is concern for acute cholecystitis. 





Head CT: FINDINGS: 


Brain: The brain demonstrates mild generalized volume loss, prominent for age. 


No hemorrhage or edema seen. 


Cerebral ventricles: The ventricles are prominent, stable compared to the prior 


study. 


Paranasal sinuses: Visualized sinuses are unremarkable. No fluid levels. 


Mastoid air cells: Visualized mastoid air cells are well aerated. 


Bones/joints: Unremarkable. No acute fracture. 


Soft tissues: Unremarkable. 





IMPRESSION: 


1. No acute intracranial abnormality seen. 


2. Ventricular enlargement is again demonstrated which may reflect volume loss 


though a component of normal pressure hydrocephalus is possible.





Gallbladder US: FINDINGS: 


Liver: Liver is echogenic. No liver masses. 


Gallbladder: Gallbladder is distended. Layering sludge in the gallbladder 


lumen. No gallbladder wall thickening or pericholecystic fluid. 


Common bile duct: Normal. No stones. No biliary duct dilation. 


Pancreas: Visualized pancreas is unremarkable. 


Right kidney: Normal. No mass. No hydronephrosis. 





IMPRESSION: 


1. Distended gallbladder with intraluminal sludge. No signs of acute 


cholecystitis or biliary duct dilation. 


2. Hepatic steatosis.  ]





MICROBIOLOGY: Please see below. 





ASSESSMENT: [This is a 59 y/o female with a pmh of cva x2, seizures, hld, htn, 

asthma who reports to the ED with a cc of nausea and vomiting x2 weeks. Patient 

states that her symptoms came on suddenly and believes they may have improved so

mewhat, but still feels very ill. Lab workup notable for hyponatremia of 126, 

potassium of 2.5, lipase of 1572, ast:alt 2:1, elevated ggt of 1304.].


.


PLAN:


1. [N/V


- Etiology unclear. Labs somewhat indicative of alcohol abuse d/t present 

electrolyte abnormalities, elevated ggt and ast:alt of 2:1, however patient 

denies this. Patient may have mild alcoholic pancreatitis vs. other


- Imaging mostly unrevealing save biliary sludge.


- ua and uds pending


- Will begin IVF d/t dehydration


- Clear liquid diet


- Zofran for n/v


- Will begin protonix bid and carafate d/t prolonged n/v 


- Admit to med surg w tele





2. Hypokalemia


- 2/2 dehydration, vomiting


- Patient received 40 meQ of K in the ed


- Will run ns with 40meQ of k overnight as maintenance fluid


- recheck bmp in the morning


- pt on tele





3. Hyponatremia


- 2/2 dehydration


- maintenance fluids


- recheck bmp in the morning





4. Seizures


- continue keppra





5. HTN


- continue amlodipine





6. Overactive bladder


- continue oxybutynin





DVT prophylaxis


- Teds and scds].





Vital Signs





Vital Signs








  Date Time  Temp Pulse Resp B/P (MAP) Pulse Ox O2 Delivery O2 Flow Rate FiO2


 


6/14/21 22:46  100 16 113/56 (75) 94 Room Air  


 


6/14/21 16:43 97.5       











Laboratory Data


Labs 24H


Laboratory Tests 2


6/14/21 18:10: 


Immature Granulocyte % (Auto) 0.6, Neutrophils (%) (Auto) 67.4H, Lymphocytes (%)

(Auto) 17.5L, Monocytes (%) (Auto) 12.6H, Eosinophils (%) (Auto) 1.2, Basophils 

(%) (Auto) 0.7, Neutrophils # (Auto) 8.6H, Lymphocytes # (Auto) 2.2, Monocytes #

(Auto) 1.6H, Eosinophils # (Auto) 0.2, Basophils # (Auto) 0.1, Nucleated Red 

Blood Cells % (auto) 0.2H, Anion Gap 13, Glomerular Filtration Rate > 60.0, 

Calcium Level 10.8H, Total Bilirubin 0.6, Direct Bilirubin 0.3H, Gamma Glutamyl 

Transferase 1304H, Aspartate Amino Transf (AST/SGOT) 46H, Alanine 

Aminotransferase (ALT/SGPT) 21, Alkaline Phosphatase 207H, Total Protein 8.5H, 

Albumin 4.0, Albumin/Globulin Ratio 0.9L, Lipase 1572H, Ethyl Alcohol Level 

0.005, Hepatitis A IgM Antibody NEGATIVE, Hepatitis B Surface Antigen NEGATIVE, 

Hepatitis B Core IgM Antibody NEGATIVE, Hepatitis C Antibody Index < 0.0


6/14/21 20:00: 


Coronavirus (COVID-19)(PCR) NEGATIVE, Influenza Type A (RT-PCR) NEGATIVE, 

Influenza Type B (RT-PCR) NEGATIVE, Respiratory Syncytial Virus (PCR) NEGATIVE


CBC/BMP


Laboratory Tests


6/14/21 18:10











Home Medications


Scheduled


Amlodipine Besylate (Amlodipine Besylate) 10 Mg Tablet, 10 MG PO DAILY


Ascorbic Acid (Vitamin C) 500 Mg Tablet, 500 MG PO DAILY


Cholecalciferol (Vitamin D3) (Vitamin D3) 1,000 Unit Tablet, 1,000 UNITS PO 

DAILY


Cyanocobalamin (Vitamin B-12) (Vitamin B-12) 1,000 Mcg Tablet, 1,000 MCG PO 

DAILY


Levetiracetam (Levetiracetam) 750 Mg Tablet, 750 MG PO BID


Oxybutynin Chloride (Oxybutynin Chloride ER) 10 Mg Tab.er.24, 10 MG PO DAILY





Allergies


Coded Allergies:  


     latex (Verified  Allergy, Intermediate, rash, 12/10/19)


Uncoded Allergies:  


     TIDE (Allergy, Intermediate, rash, 11/27/19)





A-FIB/CHADSVASC


A-FIB History


Current/History of A-Fib/PAF?:  No





Attending Note


Attending Note


time of service 1045pm





Ms. Snider is a 60 yr old F w a hx of subdural hematoma, CVA, HTN, psoriatic 

arthritis, asthma, anxiety, depression, fatty liver and diverticulosis who 

presented w c/o n/v for two weeks w/o the presence of abdominal or back pain.





Her physical exam was only remarkable for a slim build





Admitting diagnoses:


#SIRS


#Vomiting of unclear cause


#Elevated lipase of unclear cause (doesnt meet Shelby criteria to diagnose 

pancreatitis)


# Hypokalemia, Hyponatremia and Hyochloremia 2/2 vomiting


Plan: f/u work-up for infection/ IVF/ f/u repeat lytes & UDS





Rest per NIEVES Love H&P





LATE ENTRY 507AM


Vomiting may be related to THC use; the day time team may follow up to quantify 

her use.











PEGGY KWOK            Jun 14, 2021 23:15


DAKOTA CORTEZ MD                Vinay 15, 2021 05:08

## 2021-06-14 NOTE — REPVR
PROCEDURE INFORMATION: 

Exam: US Abdomen, Limited; Right Upper Quadrant 

Exam date and time: 6/14/2021 11:02 PM 

Age: 60 years old 

Clinical indication: Nausea and vomiting; Additional info: Gallbladder 

distention on CT 



TECHNIQUE: 

Imaging protocol: US abdomen. Real time ultrasound with image documentation. 

Limited exam focused on the right upper quadrant. 



COMPARISON: 

CT ABD/PEL W/IV CONTRAST ONLY 6/14/2021 7:20 PM 



FINDINGS: 

Liver: Liver is echogenic. No liver masses. 

Gallbladder: Gallbladder is distended. Layering sludge in the gallbladder 

lumen. No gallbladder wall thickening or pericholecystic fluid. 

Common bile duct: Normal. No stones. No biliary duct dilation. 

Pancreas: Visualized pancreas is unremarkable. 

Right kidney: Normal. No mass. No hydronephrosis. 



IMPRESSION: 

1. Distended gallbladder with intraluminal sludge. No signs of acute 

cholecystitis or biliary duct dilation. 

2. Hepatic steatosis. 



Electronically signed by: Mikhail Mendoza On 06/14/2021  23:16:07 PM

## 2021-06-15 VITALS — DIASTOLIC BLOOD PRESSURE: 71 MMHG | SYSTOLIC BLOOD PRESSURE: 106 MMHG

## 2021-06-15 VITALS — DIASTOLIC BLOOD PRESSURE: 69 MMHG | SYSTOLIC BLOOD PRESSURE: 104 MMHG

## 2021-06-15 VITALS — SYSTOLIC BLOOD PRESSURE: 108 MMHG | DIASTOLIC BLOOD PRESSURE: 71 MMHG

## 2021-06-15 VITALS — DIASTOLIC BLOOD PRESSURE: 76 MMHG | SYSTOLIC BLOOD PRESSURE: 117 MMHG

## 2021-06-15 LAB
ALBUMIN SERPL BCG-MCNC: 3 GM/DL (ref 3.2–5.2)
ALT SERPL W P-5'-P-CCNC: 17 U/L (ref 12–78)
AMPHETAMINES UR QL SCN: NEGATIVE
APPEARANCE UR: (no result)
BACTERIA UR QL AUTO: (no result)
BARBITURATES UR QL SCN: NEGATIVE
BENZODIAZ UR QL SCN: NEGATIVE
BILIRUB SERPL-MCNC: 0.6 MG/DL (ref 0.2–1)
BILIRUB UR QL STRIP.AUTO: NEGATIVE
BUN SERPL-MCNC: 9 MG/DL (ref 7–18)
BZE UR QL SCN: NEGATIVE
CALCIUM SERPL-MCNC: 8.9 MG/DL (ref 8.5–10.1)
CANNABINOIDS UR QL SCN: POSITIVE
CHLORIDE SERPL-SCNC: 98 MEQ/L (ref 98–107)
CO2 SERPL-SCNC: 26 MEQ/L (ref 21–32)
CREAT SERPL-MCNC: 0.66 MG/DL (ref 0.55–1.3)
ETHANOL SERPL-MCNC: < 0.003 % (ref 0–0.01)
GFR SERPL CREATININE-BSD FRML MDRD: > 60 ML/MIN/{1.73_M2} (ref 51–?)
GLUCOSE SERPL-MCNC: 121 MG/DL (ref 70–100)
GLUCOSE UR QL STRIP.AUTO: NEGATIVE MG/DL
HCT VFR BLD AUTO: 36.1 % (ref 36–47)
HGB BLD-MCNC: 12.7 G/DL (ref 12–15.5)
HGB UR QL STRIP.AUTO: NEGATIVE
KETONES UR QL STRIP.AUTO: (no result) MG/DL
LEUKOCYTE ESTERASE UR QL STRIP.AUTO: (no result)
LIPASE SERPL-CCNC: 1194 U/L (ref 73–393)
MAGNESIUM SERPL-MCNC: 1.9 MG/DL (ref 1.8–2.4)
MAGNESIUM SERPL-MCNC: 2 MG/DL (ref 1.8–2.4)
MCH RBC QN AUTO: 33.9 PG (ref 27–33)
MCHC RBC AUTO-ENTMCNC: 35.2 G/DL (ref 32–36.5)
MCV RBC AUTO: 96.3 FL (ref 80–96)
METHADONE UR QL SCN: NEGATIVE
NITRITE UR QL STRIP.AUTO: POSITIVE
OPIATES UR QL SCN: NEGATIVE
OSMOLALITY SERPL: 272 MOSM/KG (ref 275–295)
OSMOLALITY UR: 462 MOSM/KG (ref 50–1400)
PCP UR QL SCN: NEGATIVE
PH UR STRIP.AUTO: 6 UNITS (ref 5–9)
PLATELET # BLD AUTO: 380 10^3/UL (ref 150–450)
POTASSIUM SERPL-SCNC: 3.8 MEQ/L (ref 3.5–5.1)
PROT SERPL-MCNC: 6.6 GM/DL (ref 6.4–8.2)
PROT UR QL STRIP.AUTO: NEGATIVE MG/DL
PTH-INTACT SERPL-MCNC: 43 PG/ML (ref 18.5–88)
RBC # BLD AUTO: 3.75 10^6/UL (ref 4–5.4)
RBC # UR AUTO: 8 /HPF (ref 0–3)
SODIUM SERPL-SCNC: 134 MEQ/L (ref 136–145)
SODIUM UR-SCNC: 10 MEQ/L
SP GR UR STRIP.AUTO: >1.06 (ref 1–1.03)
SQUAMOUS #/AREA URNS AUTO: 2 /HPF (ref 0–6)
TSH SERPL DL<=0.005 MIU/L-ACNC: 0.98 UIU/ML (ref 0.36–3.74)
UROBILINOGEN UR QL STRIP.AUTO: 2 MG/DL (ref 0–2)
WBC # BLD AUTO: 10.5 10^3/UL (ref 4–10)
WBC #/AREA URNS AUTO: 14 /HPF (ref 0–3)

## 2021-06-15 RX ADMIN — DEXTROSE MONOHYDRATE SCH MG: 50 INJECTION, SOLUTION INTRAVENOUS at 20:21

## 2021-06-15 RX ADMIN — SUCRALFATE SCH GM: 1 TABLET ORAL at 08:30

## 2021-06-15 RX ADMIN — LEVETIRACETAM SCH MG: 250 TABLET, FILM COATED ORAL at 08:30

## 2021-06-15 RX ADMIN — POTASSIUM CHLORIDE AND SODIUM CHLORIDE SCH MLS/HR: 900; 300 INJECTION, SOLUTION INTRAVENOUS at 08:29

## 2021-06-15 RX ADMIN — OXYBUTYNIN CHLORIDE SCH MG: 5 TABLET, EXTENDED RELEASE ORAL at 08:30

## 2021-06-15 RX ADMIN — OXYCODONE HYDROCHLORIDE AND ACETAMINOPHEN SCH MG: 500 TABLET ORAL at 08:30

## 2021-06-15 RX ADMIN — DEXTROSE MONOHYDRATE SCH MG: 50 INJECTION, SOLUTION INTRAVENOUS at 08:29

## 2021-06-15 RX ADMIN — DOCUSATE SODIUM SCH MG: 100 CAPSULE, LIQUID FILLED ORAL at 20:20

## 2021-06-15 RX ADMIN — DOCUSATE SODIUM SCH MG: 100 CAPSULE, LIQUID FILLED ORAL at 08:30

## 2021-06-15 RX ADMIN — LEVETIRACETAM SCH MG: 250 TABLET, FILM COATED ORAL at 20:20

## 2021-06-15 RX ADMIN — SUCRALFATE SCH GM: 1 TABLET ORAL at 20:21

## 2021-06-15 RX ADMIN — POTASSIUM CHLORIDE AND SODIUM CHLORIDE SCH MLS/HR: 900; 300 INJECTION, SOLUTION INTRAVENOUS at 17:55

## 2021-06-15 NOTE — ECGEPIP
[facility] - ED

                                       

                                       Test Date:    2021

Pat Name:     ALLAN CALDWELL              Department:   

Patient ID:   [id number]                 Room:         [place] [address]

Gender:       Female                   Technician:   AR

:          1960               Requested By: ROXANN FOX

Order Number: [id number]     Reading MD:   Katya Figueroa

                                 Measurements

Intervals                              Axis          

Rate:         111                      P:            68

MN:           140                      QRS:          46

QRSD:         84                       T:            90

QT:           376                                    

QTc:          511                                    

                           Interpretive Statements

Sinus tachycardia with premature atrial complexes

Possible Left atrial enlargement

Left ventricular hypertrophy with repolarization abnormality ( Sokolow-Cervantes )

vs i

ischemia

decreased rate 18

Electronically Signed on 6- 20:49:45 EDT by Katya Figueroa

## 2021-06-15 NOTE — IPNPDOC
Text Note


Date of Service


The patient was seen on 6/15/21.





NOTE


SUBJECTIVE:





PHYSICAL EXAMINATION:


VITAL SIGNS: see below


GENERAL APPEARANCE: Awake, alert, oriented x 3. NAD


HEENT: Atraumatic, normocephalic. Eyes are anicteric. Mucous membranes are dry 


CARDIOVASCULAR: sinus, regular rhythm, no noted murmurs, rubs or gallops


LUNGS: CTAB


ABDOMEN: Normoactive sounds, soft, nondistended. Mild epigastric TTP that 

elicits nausea


EXTREMITIES: No lower extremity edema, no apparent rashes/petechiae. 


NEUROLOGICAL: Awake, speech is clear, AOx3, good strength throughout





LABORATORY DATA: See Below


IMAGING: No recent studies.








ASSESSMENT: 


59 y/o female with PMHx of CVA x2, seizures, HLD, HTN, and asthma presenting 

with 2 weeks of n/v and poor po intake in conjunction with congestion and a 

slightly productive cough. Labs c/w hyponatremia and hypokalemia now resolved. 





PLAN:





N/V: Labs demonstrating possible pancreatitis, and US with gallbladder/biliary 

sludge. Possible THC hyperemesis, or post tussive emesis based on review of 

history. Lipase has trended downwards and patient has tolerated regular diet 

this morning without difficulties. 


- Continue protonix BID and Carafate


- Start on Omeprazole for home


- Transition to Zofran 4mg ODT PRN Nausea


- Regular diet


 


Hypokalemia: Resolved following repletion


- recheck labs in the am


- d/c tele





Hyponatremia: Resolved following IVF and return to regular diet.


- D/c maintenance fluids


- Recheck labs in the am





Seizure hx: continue Keppra


HLD/HTN: Continue home meds





Dispo: Likely home in the morning with follow up with PCM in 5 days


Diet: Regular diet


DVT Prophy: None


Consults: None





VS,Fishbone, I+O


VS, Fishbone, I+O


Laboratory Tests


6/14/21 18:10








6/15/21 02:17











Vital Signs








  Date Time  Temp Pulse Resp B/P (MAP) Pulse Ox O2 Delivery O2 Flow Rate FiO2


 


6/15/21 08:32  91  108/71    


 


6/15/21 06:00 97.8  16  96 Room Air  














I&O- Last 24 Hours up to 6 AM 


 


 6/15/21





 06:00


 


Intake Total 1975 ml


 


Output Total 425 ml


 


Balance 1550 ml

















TORIBIO WALLS MD MPH      Vinay 15, 2021 14:09

## 2021-06-16 VITALS — DIASTOLIC BLOOD PRESSURE: 68 MMHG | SYSTOLIC BLOOD PRESSURE: 102 MMHG

## 2021-06-16 VITALS — SYSTOLIC BLOOD PRESSURE: 107 MMHG | DIASTOLIC BLOOD PRESSURE: 75 MMHG

## 2021-06-16 VITALS — DIASTOLIC BLOOD PRESSURE: 79 MMHG | SYSTOLIC BLOOD PRESSURE: 111 MMHG

## 2021-06-16 VITALS — DIASTOLIC BLOOD PRESSURE: 79 MMHG | SYSTOLIC BLOOD PRESSURE: 110 MMHG

## 2021-06-16 LAB
BUN SERPL-MCNC: 3 MG/DL (ref 7–18)
CALCIUM SERPL-MCNC: 9 MG/DL (ref 8.5–10.1)
CHLORIDE SERPL-SCNC: 103 MEQ/L (ref 98–107)
CO2 SERPL-SCNC: 29 MEQ/L (ref 21–32)
CREAT SERPL-MCNC: 0.56 MG/DL (ref 0.55–1.3)
GFR SERPL CREATININE-BSD FRML MDRD: > 60 ML/MIN/{1.73_M2} (ref 51–?)
GLUCOSE SERPL-MCNC: 109 MG/DL (ref 70–100)
HCT VFR BLD AUTO: 33.2 % (ref 36–47)
HGB BLD-MCNC: 11.3 G/DL (ref 12–15.5)
LIPASE SERPL-CCNC: 1233 U/L (ref 73–393)
LIPASE SERPL-CCNC: 1311 U/L (ref 73–393)
LIPASE SERPL-CCNC: 1422 U/L (ref 73–393)
MAGNESIUM SERPL-MCNC: 1.2 MG/DL (ref 1.8–2.4)
MAGNESIUM SERPL-MCNC: 1.3 MG/DL (ref 1.8–2.4)
MAGNESIUM SERPL-MCNC: 1.3 MG/DL (ref 1.8–2.4)
MCH RBC QN AUTO: 33.1 PG (ref 27–33)
MCHC RBC AUTO-ENTMCNC: 34 G/DL (ref 32–36.5)
MCV RBC AUTO: 97.4 FL (ref 80–96)
PLATELET # BLD AUTO: 408 10^3/UL (ref 150–450)
POTASSIUM SERPL-SCNC: 4 MEQ/L (ref 3.5–5.1)
RBC # BLD AUTO: 3.41 10^6/UL (ref 4–5.4)
SODIUM SERPL-SCNC: 136 MEQ/L (ref 136–145)
WBC # BLD AUTO: 6 10^3/UL (ref 4–10)

## 2021-06-16 RX ADMIN — POTASSIUM CHLORIDE AND SODIUM CHLORIDE SCH MLS/HR: 900; 300 INJECTION, SOLUTION INTRAVENOUS at 02:56

## 2021-06-16 RX ADMIN — SUCRALFATE SCH GM: 1 TABLET ORAL at 11:36

## 2021-06-16 RX ADMIN — DEXTROSE MONOHYDRATE SCH MG: 50 INJECTION, SOLUTION INTRAVENOUS at 11:36

## 2021-06-16 RX ADMIN — SODIUM CHLORIDE SCH MLS/HR: 9 INJECTION, SOLUTION INTRAVENOUS at 13:28

## 2021-06-16 RX ADMIN — DOCUSATE SODIUM SCH MG: 100 CAPSULE, LIQUID FILLED ORAL at 09:00

## 2021-06-16 RX ADMIN — SODIUM CHLORIDE SCH MLS/HR: 9 INJECTION, SOLUTION INTRAVENOUS at 11:35

## 2021-06-16 RX ADMIN — LEVETIRACETAM SCH MG: 250 TABLET, FILM COATED ORAL at 11:35

## 2021-06-16 RX ADMIN — OXYBUTYNIN CHLORIDE SCH MG: 5 TABLET, EXTENDED RELEASE ORAL at 11:36

## 2021-06-16 RX ADMIN — OXYCODONE HYDROCHLORIDE AND ACETAMINOPHEN SCH MG: 500 TABLET ORAL at 11:36

## 2021-06-18 NOTE — DS.PDOC
Discharge Summary


General


Date of Admission


Jun 14, 2021 at 16:44


Date of Discharge


6/16/2021





Discharge Summary


PROCEDURES PERFORMED DURING STAY: [None].





ADMITTING DIAGNOSES: 


1. N/V


2. Hyponatremia


3. Hypokalemia





DISCHARGE DIAGNOSES:


1. Nausea and vomiting, possible pancreatitis


2. Electrolyte abnormalities, mild


3. Hepatomegaly


4. Hepatic steatosis











COMPLICATIONS/CHIEF COMPLAINT: N/V





HISTORY OF PRESENT ILLNESS: [This is a 59 y/o female with a pmh of cva x2, 

seizures, hld, htn, asthma who reports to the ED with a cc of nausea and 

vomiting x2 weeks. Patient states that her symptoms came on suddenly and 

believes they may have improved somewhat, but still feels very ill. Patient 

states that she is vomiting approx 3 times a day and lately has noted some blood

 in the emesis. Patient states that nothing seems to bring on the episodes of 

vomiting and they simply happen. Patient states that she thinks her symptoms are

 partially due to stress because she has been trying to move back home to 

[place] and has run into issues with that. Patient denies fever, chills, abd 

pain, constipation, diarrhea, headaches. Patient admits to recent poor oral 

intake.]





HOSPITAL COURSE: 


Mrs. Snider was admitted for nausea and vomiting with poor oral intake and 

electrolyte balances and was treated for possible pancreatitis based off of lab 

values. She never truly experienced abdominal pain but had reflux symptoms with 

epigastric palpation. By hospital day 1 she was tolerating a regular diet and 

had resolution of her nausea with PRN zofran and a PPI. She was noted to have a 

stable/non downtrending lipase during her hospitalization and was monitored for 

an additional night with no return of her symptoms and no development of 

abdominal pain. She did endorse some blood in emesis but states that it was more

associated with dry heaving and was scant in nature. She has not experienced any

emesis since that time. She has been encouraged to follow up with her PCM and a 

Gastroenterologist in her home city of [place], [place] to have an EGD for further 

workup of this. On day of discharge she continued to have a good appetite and 

was ambulating within her bedroom. She was given the opportunity to ask 

questions and ultimately discharged to home with stable vital signs and a benign

abdominal exam.


Of note she had mild electrolyte disturbances and had repletion of magnesium, 

sodium, and potassium and was encouraged to take a MVI and follow up with her 

PCM upon discharge. 





DISCHARGE MEDICATIONS: Please see below.


 


ALLERGIES: Please see below.





PHYSICAL EXAMINATION ON DISCHARGE:


VITAL SIGNS: Please see below.


GENERAL APPEARANCE: Awake, alert, oriented x 3. NAD


HEENT: Atraumatic, normocephalic. Eyes are anicteric. Mucous membranes are moist




CARDIOVASCULAR: sinus, regular rhythm, no noted murmurs, rubs or gallops


LUNGS: CTAB


ABDOMEN: Normoactive sounds, soft, nondistended. No TTP


EXTREMITIES: No lower extremity edema, no apparent rashes/petechiae. 


NEUROLOGICAL: Awake, speech is clear, AOx3, good strength throughout





LABORATORY DATA: Please see below.





IMAGING: 


CT ABD:





FINDINGS: 


Liver: There is hepatomegaly and hepatic steatosis. 


Gallbladder and bile ducts: Gallbladder is distended. No gallbladder wall 


calculi or biliary duct dilation. 


Pancreas: Normal. No ductal dilation. 


Spleen: Normal. No splenomegaly. 


Adrenal glands: Normal. No mass. 


Kidneys and ureters: Unremarkable. No calculi or hydronephrosis. 


Stomach and bowel: There is colonic diverticulosis without evidence of 


diverticulitis. The small bowel is unremarkable. No bowel obstruction. 


Appendix: No evidence of appendicitis. 





Intraperitoneal space: No free air. No significant fluid collection. 


Vasculature: Unremarkable. No abdominal aortic aneurysm. 


Lymph nodes: Unremarkable. No enlarged lymph nodes. 


Urinary bladder: Unremarkable as visualized. 


Reproductive: Unremarkable as visualized. 


Bones/joints: There are degenerative changes in the spine and pelvis. 


Soft tissues: Unremarkable. 





IMPRESSION: 


1. Hepatic steatosis and hepatomegaly. 


2. Colonic diverticulosis without evidence of diverticulitis. 


3. Distended gallbladder without secondary signs of cholecystitis. Consider 


gallbladder ultrasound follow-up if there is concern for acute cholecystitis. 





CT Head:





FINDINGS: 


Brain: The brain demonstrates mild generalized volume loss, prominent for age. 


No hemorrhage or edema seen. 


Cerebral ventricles: The ventricles are prominent, stable compared to the prior 


study. 


Paranasal sinuses: Visualized sinuses are unremarkable. No fluid levels. 


Mastoid air cells: Visualized mastoid air cells are well aerated. 


Bones/joints: Unremarkable. No acute fracture. 


Soft tissues: Unremarkable. 





IMPRESSION: 


1. No acute intracranial abnormality seen. 


2. Ventricular enlargement is again demonstrated which may reflect volume loss 


though a component of normal pressure hydrocephalus is possible. 





US Gallbladder


FINDINGS: 


Liver: Liver is echogenic. No liver masses. 


Gallbladder: Gallbladder is distended. Layering sludge in the gallbladder 


lumen. No gallbladder wall thickening or pericholecystic fluid. 


Common bile duct: Normal. No stones. No biliary duct dilation. 


Pancreas: Visualized pancreas is unremarkable. 


Right kidney: Normal. No mass. No hydronephrosis. 





IMPRESSION: 


1. Distended gallbladder with intraluminal sludge. No signs of acute 


cholecystitis or biliary duct dilation. 


2. Hepatic steatosis. 





PROGNOSIS: [good]





ACTIVITY: [As tolerated].





DIET: [regular]





DISCHARGE PLAN: 


Follow up with your primary care provider within 1 week to discuss scheduling 

EGD with gastroenterology


Return to the ED if you develop abdominal pain, inability to tolerate food, 

fevers, or any other acute medical concerns





DISPOSITION: 01 Home, Self-Care.





ITEMS TO FOLLOWUP ON ON OUTPATIENT:


1. Hepatic steatosis 


2. BMP and electrolyte panel





DISCHARGE CONDITION: [Stable].





TIME SPENT ON DISCHARGE: Greater than 30 minutes.





Vital Signs/I&Os





Vital Signs








  Date Time  Temp Pulse Resp B/P (MAP) Pulse Ox O2 Delivery O2 Flow Rate FiO2


 


6/16/21 14:00 98.2 95 20 107/75 (86) 93 Room Air  











Microbiology





Microbiology


6/15/21 Blood Culture - Preliminary, Resulted


          No Growth after 72 hours. All specime...





Discharge Medications


Scheduled


Amlodipine Besylate (Amlodipine Besylate) 10 Mg Tablet, 10 MG PO DAILY, 

(Reported)


Ascorbic Acid (Vitamin C) 500 Mg Tablet, 500 MG PO DAILY, (Reported)


Cholecalciferol (Vitamin D3) (Vitamin D3) 1,000 Unit Tablet, 1,000 UNITS PO 

DAILY, (Reported)


Cyanocobalamin (Vitamin B-12) (Vitamin B-12) 1,000 Mcg Tablet, 1,000 MCG PO 

DAILY, (Reported)


Levetiracetam (Levetiracetam) 750 Mg Tablet, 750 MG PO BID, (Reported)


Ondansetron HCl (Zofran) 4 Mg Tablet, 4 MG PO TID


Oxybutynin Chloride (Oxybutynin Chloride ER) 10 Mg Tab.er.24, 10 MG PO DAILY, 

(Reported)


Pantoprazole Sodium (Protonix) 20 Mg Tablet.dr, 20 MG PO DAILY





Allergies


Coded Allergies:  


     latex (Verified  Allergy, Intermediate, rash, 12/10/19)


     soap (Verified  Allergy, Intermediate, TIDE - RASH, 6/15/21)











TORIBIO WALLS MD M[facility]      Jun 18, 2021 06:51